# Patient Record
Sex: MALE | Race: OTHER | NOT HISPANIC OR LATINO
[De-identification: names, ages, dates, MRNs, and addresses within clinical notes are randomized per-mention and may not be internally consistent; named-entity substitution may affect disease eponyms.]

---

## 2018-12-04 ENCOUNTER — APPOINTMENT (OUTPATIENT)
Dept: UROLOGY | Facility: CLINIC | Age: 28
End: 2018-12-04

## 2019-11-22 ENCOUNTER — APPOINTMENT (OUTPATIENT)
Dept: BARIATRICS | Facility: CLINIC | Age: 29
End: 2019-11-22
Payer: COMMERCIAL

## 2019-11-22 ENCOUNTER — LABORATORY RESULT (OUTPATIENT)
Age: 29
End: 2019-11-22

## 2019-11-22 VITALS
SYSTOLIC BLOOD PRESSURE: 137 MMHG | HEIGHT: 74.5 IN | WEIGHT: 315 LBS | TEMPERATURE: 97.9 F | BODY MASS INDEX: 40 KG/M2 | DIASTOLIC BLOOD PRESSURE: 88 MMHG | OXYGEN SATURATION: 98 % | HEART RATE: 56 BPM

## 2019-11-22 PROCEDURE — 99212 OFFICE O/P EST SF 10 MIN: CPT

## 2019-11-25 LAB
ALBUMIN SERPL ELPH-MCNC: 4.5 G/DL
ALP BLD-CCNC: 89 U/L
ALT SERPL-CCNC: 22 U/L
ANION GAP SERPL CALC-SCNC: 13 MMOL/L
AST SERPL-CCNC: 19 U/L
BASOPHILS # BLD AUTO: 0.04 K/UL
BASOPHILS NFR BLD AUTO: 0.6 %
BILIRUB SERPL-MCNC: 0.4 MG/DL
BUN SERPL-MCNC: 12 MG/DL
CALCIUM SERPL-MCNC: 9.3 MG/DL
CHLORIDE SERPL-SCNC: 105 MMOL/L
CO2 SERPL-SCNC: 24 MMOL/L
CREAT SERPL-MCNC: 1.04 MG/DL
EOSINOPHIL # BLD AUTO: 0.23 K/UL
EOSINOPHIL NFR BLD AUTO: 3.4 %
GLUCOSE SERPL-MCNC: 90 MG/DL
HCT VFR BLD CALC: 48.4 %
HGB BLD-MCNC: 15.5 G/DL
IMM GRANULOCYTES NFR BLD AUTO: 0.6 %
LYMPHOCYTES # BLD AUTO: 2.54 K/UL
LYMPHOCYTES NFR BLD AUTO: 38.1 %
MAGNESIUM SERPL-MCNC: 2.2 MG/DL
MAN DIFF?: NORMAL
MCHC RBC-ENTMCNC: 27.1 PG
MCHC RBC-ENTMCNC: 32 GM/DL
MCV RBC AUTO: 84.8 FL
MONOCYTES # BLD AUTO: 0.71 K/UL
MONOCYTES NFR BLD AUTO: 10.6 %
NEUTROPHILS # BLD AUTO: 3.11 K/UL
NEUTROPHILS NFR BLD AUTO: 46.7 %
PHOSPHATE SERPL-MCNC: 3.9 MG/DL
PLATELET # BLD AUTO: 238 K/UL
POTASSIUM SERPL-SCNC: 5 MMOL/L
PROT SERPL-MCNC: 7.1 G/DL
RBC # BLD: 5.71 M/UL
RBC # FLD: 13.2 %
SODIUM SERPL-SCNC: 142 MMOL/L
WBC # FLD AUTO: 6.67 K/UL

## 2019-11-27 ENCOUNTER — APPOINTMENT (OUTPATIENT)
Dept: BARIATRICS | Facility: CLINIC | Age: 29
End: 2019-11-27
Payer: COMMERCIAL

## 2019-11-27 VITALS
HEIGHT: 74.5 IN | WEIGHT: 315 LBS | HEART RATE: 65 BPM | BODY MASS INDEX: 40 KG/M2 | DIASTOLIC BLOOD PRESSURE: 79 MMHG | OXYGEN SATURATION: 98 % | TEMPERATURE: 97.7 F | SYSTOLIC BLOOD PRESSURE: 115 MMHG

## 2019-11-27 PROCEDURE — 99213 OFFICE O/P EST LOW 20 MIN: CPT

## 2019-11-27 NOTE — HISTORY OF PRESENT ILLNESS
[de-identified] : s/p .lap band and seen on friday with progressive emesis and ugi that shows hold up at band\par today removed 3 cc of fluid \par will have drink h2o and if does not go down will have to go to ed\par also have eat yogurt before leaving [facility]

## 2020-04-09 ENCOUNTER — TRANSCRIPTION ENCOUNTER (OUTPATIENT)
Age: 30
End: 2020-04-09

## 2020-11-12 ENCOUNTER — EMERGENCY (EMERGENCY)
Facility: HOSPITAL | Age: 30
LOS: 0 days | Discharge: HOME | End: 2020-11-12
Attending: STUDENT IN AN ORGANIZED HEALTH CARE EDUCATION/TRAINING PROGRAM | Admitting: STUDENT IN AN ORGANIZED HEALTH CARE EDUCATION/TRAINING PROGRAM
Payer: COMMERCIAL

## 2020-11-12 ENCOUNTER — TRANSCRIPTION ENCOUNTER (OUTPATIENT)
Age: 30
End: 2020-11-12

## 2020-11-12 VITALS
SYSTOLIC BLOOD PRESSURE: 155 MMHG | HEART RATE: 57 BPM | TEMPERATURE: 99 F | WEIGHT: 315 LBS | DIASTOLIC BLOOD PRESSURE: 75 MMHG | RESPIRATION RATE: 18 BRPM | HEIGHT: 76 IN | OXYGEN SATURATION: 99 %
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
## 2020-11-12 DIAGNOSIS — M79.672 PAIN IN LEFT FOOT: ICD-10-CM

## 2020-11-12 PROCEDURE — 99283 EMERGENCY DEPT VISIT LOW MDM: CPT

## 2020-11-12 PROCEDURE — 73630 X-RAY EXAM OF FOOT: CPT | Mod: 26,LT

## 2020-11-12 RX ORDER — IBUPROFEN 200 MG
600 TABLET ORAL ONCE
Refills: 0 | Status: COMPLETED | OUTPATIENT
Start: 2020-11-12 | End: 2020-11-12

## 2020-11-12 RX ADMIN — Medication 600 MILLIGRAM(S): at 12:18

## 2020-11-12 NOTE — ED PROVIDER NOTE - CARE PROVIDER_API CALL
Kennedy Shepard  [facility]  [address] [place], [place] [id number]  [place] [address]  Phone: [phone]  Fax: [phone]  Follow Up Time: 1-3 Days

## 2020-11-12 NOTE — ED PROVIDER NOTE - NS ED ROS FT
Constitutional: (-) fever  Eyes/ENT: (-) blurry vision, (-) epistaxis  Cardiovascular: (-) chest pain, (-) syncope  Respiratory: (-) cough, (-) shortness of breath  Gastrointestinal: (-) vomiting, (-) diarrhea  Musculoskeletal: (-) neck pain, (-) back pain, (+) foot pain   Integumentary: (-) rash, (-) edema  Neurological: (-) headache, (-) altered mental status  Psychiatric: (-) hallucinations  Allergic/Immunologic: (-) pruritus

## 2020-11-12 NOTE — ED PROVIDER NOTE - CARE PROVIDERS DIRECT ADDRESSES
[email or web address]

## 2020-11-12 NOTE — ED ADULT TRIAGE NOTE - STATUS:
Chest x-ray:  Two views.

 

History:  Chest pain.  No comparison views.

 

Findings:  The patient is status post prior median sternotomy.  EKG monitoring

electrodes overlie the chest.  The heart is mildly enlarged.  Pulmonary

vasculature is not increased.  Pleural angles are sharp.  No significant bony

abnormality is seen.

 

Impression:

 

Mild cardiomegaly.  Otherwise no acute disease.

 

 

Signed by

Ric Dowell MD 01/07/2017 08:47 A [employer]

## 2020-11-12 NOTE — ED PROVIDER NOTE - OBJECTIVE STATEMENT
Pt is a 31 yo M no sig pmh presenting w/ L foot pain. Per pt this am @ 6am he stepped on a lego at home. Since then has been having significant  pain especially on ambulation. Pt went to an [facility] but was unable to get an xray due to lack of techs and was sent to the ED for eval.

## 2020-11-12 NOTE — ED PROVIDER NOTE - PHYSICAL EXAMINATION
CONSTITUTIONAL: Well-developed; well-nourished; in no acute distress.   SKIN: warm, dry  HEAD: Normocephalic; atraumatic.  EYES: PERRL, EOMI, normal sclera and conjunctiva   ENT: No nasal discharge; airway clear.  NECK: Supple; non tender.  CARD:  Regular rate and rhythm.   RESP: NO inc WOB   ABD: soft ntnd  EXT: Normal ROM.  +mild tenderness to palpation over the distal plantar midfoot around 5th toe, no laceration/absrasion, no ecchymosis  LYMPH: No acute cervical adenopathy.  NEURO: Alert, oriented, grossly unremarkable  PSYCH: Cooperative, appropriate.

## 2020-11-12 NOTE — ED PROVIDER NOTE - CLINICAL SUMMARY MEDICAL DECISION MAKING FREE TEXT BOX
pt w/ foot pain after stepping on leggo. pain around plantar 5th toe radiating around plantar aspect of foot. xr w/o fx, pt ambulatory. will rec RICE, nsaids, WBAT

## 2020-11-12 NOTE — ED PROVIDER NOTE - PROGRESS NOTE DETAILS
OG : discussed results with pt. He was offered boot but declined . We offered him and ace wrap and ice pack and he declined

## 2020-11-12 NOTE — ED PROVIDER NOTE - ATTENDING CONTRIBUTION TO CARE
31 yo m presents w/ L foot pain after stepping on leggo yesterday. mild redness/tenderness to plantar aspect of distal midfoot.  pt ambulatory w/ pain.     vss  gen- NAD, aaox3  card-rrr  lungs-ctab, no wheezing or rhonchi  abd-sntnd, no guarding or rebound  neuro- full str/sensation, cn ii-xii grossly intact, normal coordination and gait  LLE- mild tenderness to distal plantar midfoot, no laceration/absrasion, no ecchymosis      XR, NSAID 29 yo m presents w/ L foot pain after stepping on leggo yesterday. mild redness/tenderness to plantar aspect of distal midfoot.  pt ambulatory w/ pain.     vss  gen- NAD, aaox3  card-rrr  lungs-ctab, no wheezing or rhonchi  abd-sntnd, no guarding or rebound  neuro- full str/sensation, cn ii-xii grossly intact, normal coordination and gait  LLE- mild tenderness to distal plantar midfoot around 5th toe, no laceration/absrasion, no ecchymosis      XR r/o fx, NSAID/RICE

## 2020-11-12 NOTE — ED ADULT NURSE NOTE - OBJECTIVE STATEMENT
Pt presented to ED c/o foot pain. Pt sent from outpatient [facility] for L foot pain. Pt able to ambulate as tolerated, denies n/v/d/fevers/chills. + pulses noted.

## 2020-11-12 NOTE — ED PROVIDER NOTE - PATIENT PORTAL LINK FT
You can access the FollowMyHealth Patient Portal offered by [facility] by registering at the following website: [email or web address]. By joining [employer]’s FollowMyHealth portal, you will also be able to view your health information using other applications (apps) compatible with our system.

## 2020-11-25 ENCOUNTER — APPOINTMENT (OUTPATIENT)
Dept: BARIATRICS | Facility: CLINIC | Age: 30
End: 2020-11-25

## 2021-04-27 ENCOUNTER — TRANSCRIPTION ENCOUNTER (OUTPATIENT)
Age: 31
End: 2021-04-27

## 2021-08-13 ENCOUNTER — APPOINTMENT (OUTPATIENT)
Dept: SURGERY | Facility: CLINIC | Age: 31
End: 2021-08-13
Payer: COMMERCIAL

## 2021-08-13 VITALS
TEMPERATURE: 95.8 F | HEIGHT: 75 IN | OXYGEN SATURATION: 98 % | HEART RATE: 81 BPM | DIASTOLIC BLOOD PRESSURE: 70 MMHG | BODY MASS INDEX: 39.17 KG/M2 | WEIGHT: 315 LBS | SYSTOLIC BLOOD PRESSURE: 120 MMHG

## 2021-08-13 PROCEDURE — 99244 OFF/OP CNSLTJ NEW/EST MOD 40: CPT

## 2021-08-17 NOTE — ASSESSMENT
[FreeTextEntry1] : 31 yo M s/p lap band placement at age 17 and subsequent removal in Nov 2021 due to a slipped band, with class 3 obesity, here today to discuss further surgical weight loss options

## 2021-08-17 NOTE — PLAN
[FreeTextEntry1] : Mr. MARTIN FLANNERY is a 30 year year old male with obesity who is interested in undergoing laparoscopic sleeve gastrectomy for weight loss.  We discussed in detail how Bariatric Surgery is a tool to help treat the serious disease of obesity, and that His compliance with diet, exercise, and follow-up is crucial to His overall safety and success.  He will require the following pre-operative evaluations and testing:\par \par Lab work\par Psychological evaluation\par Diet history\par Cardiology evaluation\par Pulmonology evaluation, including sleep study\par Gastroenterology evaluation for upper endoscopy\par PMD clearance\par Nutritional evaluation\par Attendance at preoperative support groups\par \par We had a discussion about potential post-operative complications, including but not limited to: bleeding, infection, leak, stricture, blood clots, GERD, problems with anesthesia.  The patient understands and wishes to proceed.\par \par We discussed that smoking of any kind will preclude the patient from being able to have bariatric surgery.\par \par

## 2021-08-17 NOTE — HISTORY OF PRESENT ILLNESS
[de-identified] : Mr. MARTIN AESNCIO is a pleasant 30 year year old male who has been struggling with His weight for many years.    Mr. MARTIN ASENCIO has tried countless diet and exercise programs, but has been unable to lose sufficient weight and keep it off.  He is here today to discuss surgical options for weight loss.\par \par Mr Asencio had a lap band placed by Dr Ho at age 17 and was able to lose a significant amount of weight after that procedure. He was at one point able to achieve a weight of 285 lbs. 6 years ago he began having issues with the band and eventually was diagnosed with a slipped band and the band was removed. Since the band was removed has thinks he has gained around 200 lbs. He felt like he was able to lose weight with the band when it was functional. His mother has a sleeve and was very successful with her weight loss.\par \par He is very active and is a . He works out (running and lifting weight) often. 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 \par \par No other medical problems. GERD disappeared after band removed.

## 2021-09-14 ENCOUNTER — APPOINTMENT (OUTPATIENT)
Dept: SURGERY | Facility: CLINIC | Age: 31
End: 2021-09-14

## 2021-10-06 ENCOUNTER — NON-APPOINTMENT (OUTPATIENT)
Age: 31
End: 2021-10-06

## 2021-11-04 ENCOUNTER — TRANSCRIPTION ENCOUNTER (OUTPATIENT)
Age: 31
End: 2021-11-04

## 2021-11-04 ENCOUNTER — APPOINTMENT (OUTPATIENT)
Dept: SURGERY | Facility: CLINIC | Age: 31
End: 2021-11-04
Payer: COMMERCIAL

## 2021-11-04 VITALS — HEIGHT: 75 IN

## 2021-11-04 DIAGNOSIS — R11.15 CYCLICAL VOMITING SYNDROME UNRELATED TO MIGRAINE: ICD-10-CM

## 2021-11-04 PROCEDURE — ZZZZZ: CPT

## 2021-11-08 ENCOUNTER — APPOINTMENT (OUTPATIENT)
Dept: NEUROPSYCHOLOGY | Facility: CLINIC | Age: 31
End: 2021-11-08

## 2021-11-08 PROBLEM — R11.15 EMESIS, PERSISTENT: Status: RESOLVED | Noted: 2019-11-27 | Resolved: 2021-11-08

## 2021-11-09 ENCOUNTER — APPOINTMENT (OUTPATIENT)
Dept: NEUROPSYCHOLOGY | Facility: CLINIC | Age: 31
End: 2021-11-09

## 2021-11-15 ENCOUNTER — OUTPATIENT (OUTPATIENT)
Dept: OUTPATIENT SERVICES | Facility: HOSPITAL | Age: 31
LOS: 1 days | Discharge: HOME | End: 2021-11-15

## 2021-11-15 ENCOUNTER — APPOINTMENT (OUTPATIENT)
Dept: NEUROPSYCHOLOGY | Facility: CLINIC | Age: 31
End: 2021-11-15

## 2021-11-15 DIAGNOSIS — F50.9 EATING DISORDER, UNSPECIFIED: ICD-10-CM

## 2021-11-17 ENCOUNTER — APPOINTMENT (OUTPATIENT)
Age: 31
End: 2021-11-17

## 2021-12-08 ENCOUNTER — APPOINTMENT (OUTPATIENT)
Dept: SURGERY | Facility: CLINIC | Age: 31
End: 2021-12-08

## 2021-12-29 ENCOUNTER — APPOINTMENT (OUTPATIENT)
Dept: SURGERY | Facility: CLINIC | Age: 31
End: 2021-12-29

## 2022-03-03 ENCOUNTER — TRANSCRIPTION ENCOUNTER (OUTPATIENT)
Age: 32
End: 2022-03-03

## 2022-05-15 ENCOUNTER — NON-APPOINTMENT (OUTPATIENT)
Age: 32
End: 2022-05-15

## 2022-06-10 ENCOUNTER — NON-APPOINTMENT (OUTPATIENT)
Age: 32
End: 2022-06-10

## 2022-07-08 ENCOUNTER — NON-APPOINTMENT (OUTPATIENT)
Age: 32
End: 2022-07-08

## 2022-07-28 ENCOUNTER — NON-APPOINTMENT (OUTPATIENT)
Age: 32
End: 2022-07-28

## 2023-07-16 ENCOUNTER — EMERGENCY (EMERGENCY)
Facility: HOSPITAL | Age: 33
LOS: 1 days | Discharge: HOME | End: 2023-07-16
Attending: EMERGENCY MEDICINE | Admitting: EMERGENCY MEDICINE
Payer: SELF-PAY

## 2023-07-16 VITALS
WEIGHT: 315 LBS | HEIGHT: 76 IN | RESPIRATION RATE: 18 BRPM | DIASTOLIC BLOOD PRESSURE: 86 MMHG | OXYGEN SATURATION: 98 % | HEART RATE: 64 BPM | TEMPERATURE: 98 F | SYSTOLIC BLOOD PRESSURE: 130 MMHG

## 2023-07-16 VITALS
DIASTOLIC BLOOD PRESSURE: 93 MMHG | OXYGEN SATURATION: 97 % | HEART RATE: 67 BPM | TEMPERATURE: 98 F | SYSTOLIC BLOOD PRESSURE: 143 MMHG | RESPIRATION RATE: 17 BRPM

## 2023-07-16 LAB
ALBUMIN SERPL ELPH-MCNC: 3.4 G/DL — SIGNIFICANT CHANGE UP (ref 3.4–5)
ALP SERPL-CCNC: 87 U/L — SIGNIFICANT CHANGE UP (ref 40–120)
ALT FLD-CCNC: 24 U/L — SIGNIFICANT CHANGE UP (ref 12–42)
ANION GAP SERPL CALC-SCNC: 7 MMOL/L — LOW (ref 9–16)
AST SERPL-CCNC: 21 U/L — SIGNIFICANT CHANGE UP (ref 15–37)
BASOPHILS # BLD AUTO: 0.06 K/UL — SIGNIFICANT CHANGE UP (ref 0–0.2)
BASOPHILS NFR BLD AUTO: 0.5 % — SIGNIFICANT CHANGE UP (ref 0–2)
BILIRUB SERPL-MCNC: 0.2 MG/DL — SIGNIFICANT CHANGE UP (ref 0.2–1.2)
BUN SERPL-MCNC: 20 MG/DL — SIGNIFICANT CHANGE UP (ref 7–23)
CALCIUM SERPL-MCNC: 9 MG/DL — SIGNIFICANT CHANGE UP (ref 8.5–10.5)
CHLORIDE SERPL-SCNC: 107 MMOL/L — SIGNIFICANT CHANGE UP (ref 96–108)
CO2 SERPL-SCNC: 27 MMOL/L — SIGNIFICANT CHANGE UP (ref 22–31)
CREAT SERPL-MCNC: 1.29 MG/DL — SIGNIFICANT CHANGE UP (ref 0.5–1.3)
EGFR: 75 ML/MIN/1.73M2 — SIGNIFICANT CHANGE UP
EOSINOPHIL # BLD AUTO: 0.22 K/UL — SIGNIFICANT CHANGE UP (ref 0–0.5)
EOSINOPHIL NFR BLD AUTO: 1.7 % — SIGNIFICANT CHANGE UP (ref 0–6)
ETHANOL SERPL-MCNC: <3 MG/DL — SIGNIFICANT CHANGE UP
FLUAV AG NPH QL: SIGNIFICANT CHANGE UP
FLUBV AG NPH QL: SIGNIFICANT CHANGE UP
GLUCOSE SERPL-MCNC: 109 MG/DL — HIGH (ref 70–99)
HCT VFR BLD CALC: 44.8 % — SIGNIFICANT CHANGE UP (ref 39–50)
HGB BLD-MCNC: 14.9 G/DL — SIGNIFICANT CHANGE UP (ref 13–17)
IMM GRANULOCYTES NFR BLD AUTO: 0.5 % — SIGNIFICANT CHANGE UP (ref 0–0.9)
LIDOCAIN IGE QN: 69 U/L — LOW (ref 73–393)
LYMPHOCYTES # BLD AUTO: 2.6 K/UL — SIGNIFICANT CHANGE UP (ref 1–3.3)
LYMPHOCYTES # BLD AUTO: 20 % — SIGNIFICANT CHANGE UP (ref 13–44)
MAGNESIUM SERPL-MCNC: 2.3 MG/DL — SIGNIFICANT CHANGE UP (ref 1.6–2.6)
MCHC RBC-ENTMCNC: 27.9 PG — SIGNIFICANT CHANGE UP (ref 27–34)
MCHC RBC-ENTMCNC: 33.3 GM/DL — SIGNIFICANT CHANGE UP (ref 32–36)
MCV RBC AUTO: 83.9 FL — SIGNIFICANT CHANGE UP (ref 80–100)
MONOCYTES # BLD AUTO: 1.06 K/UL — HIGH (ref 0–0.9)
MONOCYTES NFR BLD AUTO: 8.2 % — SIGNIFICANT CHANGE UP (ref 2–14)
NEUTROPHILS # BLD AUTO: 8.98 K/UL — HIGH (ref 1.8–7.4)
NEUTROPHILS NFR BLD AUTO: 69.1 % — SIGNIFICANT CHANGE UP (ref 43–77)
NRBC # BLD: 0 /100 WBCS — SIGNIFICANT CHANGE UP (ref 0–0)
PLATELET # BLD AUTO: 282 K/UL — SIGNIFICANT CHANGE UP (ref 150–400)
POTASSIUM SERPL-MCNC: 4.1 MMOL/L — SIGNIFICANT CHANGE UP (ref 3.5–5.3)
POTASSIUM SERPL-SCNC: 4.1 MMOL/L — SIGNIFICANT CHANGE UP (ref 3.5–5.3)
PROT SERPL-MCNC: 7.3 G/DL — SIGNIFICANT CHANGE UP (ref 6.4–8.2)
RBC # BLD: 5.34 M/UL — SIGNIFICANT CHANGE UP (ref 4.2–5.8)
RBC # FLD: 13.2 % — SIGNIFICANT CHANGE UP (ref 10.3–14.5)
RSV RNA NPH QL NAA+NON-PROBE: SIGNIFICANT CHANGE UP
SARS-COV-2 RNA SPEC QL NAA+PROBE: SIGNIFICANT CHANGE UP
SODIUM SERPL-SCNC: 141 MMOL/L — SIGNIFICANT CHANGE UP (ref 132–145)
TROPONIN I, HIGH SENSITIVITY RESULT: 7.2 NG/L — SIGNIFICANT CHANGE UP
WBC # BLD: 12.99 K/UL — HIGH (ref 3.8–10.5)
WBC # FLD AUTO: 12.99 K/UL — HIGH (ref 3.8–10.5)

## 2023-07-16 PROCEDURE — 99285 EMERGENCY DEPT VISIT HI MDM: CPT | Mod: 25

## 2023-07-16 PROCEDURE — 99053 MED SERV 10PM-8AM 24 HR FAC: CPT

## 2023-07-16 PROCEDURE — 71045 X-RAY EXAM CHEST 1 VIEW: CPT | Mod: 26

## 2023-07-16 PROCEDURE — 29515 APPLICATION SHORT LEG SPLINT: CPT

## 2023-07-16 PROCEDURE — 73590 X-RAY EXAM OF LOWER LEG: CPT | Mod: 26,RT

## 2023-07-16 PROCEDURE — 73610 X-RAY EXAM OF ANKLE: CPT | Mod: 26,LT,76

## 2023-07-16 RX ORDER — OXYCODONE AND ACETAMINOPHEN 5; 325 MG/1; MG/1
1 TABLET ORAL
Qty: 8 | Refills: 0
Start: 2023-07-16 | End: 2023-07-17

## 2023-07-16 RX ORDER — MORPHINE SULFATE 50 MG/1
4 CAPSULE, EXTENDED RELEASE ORAL ONCE
Refills: 0 | Status: DISCONTINUED | OUTPATIENT
Start: 2023-07-16 | End: 2023-07-16

## 2023-07-16 RX ORDER — OXYCODONE AND ACETAMINOPHEN 5; 325 MG/1; MG/1
2 TABLET ORAL ONCE
Refills: 0 | Status: DISCONTINUED | OUTPATIENT
Start: 2023-07-16 | End: 2023-07-16

## 2023-07-16 RX ORDER — IBUPROFEN 200 MG
1 TABLET ORAL
Qty: 30 | Refills: 0
Start: 2023-07-16 | End: 2023-07-25

## 2023-07-16 RX ORDER — IBUPROFEN 200 MG
1 TABLET ORAL
Qty: 30 | Refills: 0
Start: 2023-07-16

## 2023-07-16 RX ORDER — MORPHINE SULFATE 50 MG/1
8 CAPSULE, EXTENDED RELEASE ORAL ONCE
Refills: 0 | Status: DISCONTINUED | OUTPATIENT
Start: 2023-07-16 | End: 2023-07-16

## 2023-07-16 RX ORDER — ONDANSETRON 8 MG/1
4 TABLET, FILM COATED ORAL ONCE
Refills: 0 | Status: COMPLETED | OUTPATIENT
Start: 2023-07-16 | End: 2023-07-16

## 2023-07-16 RX ORDER — SODIUM CHLORIDE 9 MG/ML
1000 INJECTION INTRAMUSCULAR; INTRAVENOUS; SUBCUTANEOUS ONCE
Refills: 0 | Status: COMPLETED | OUTPATIENT
Start: 2023-07-16 | End: 2023-07-16

## 2023-07-16 RX ORDER — OXYCODONE AND ACETAMINOPHEN 5; 325 MG/1; MG/1
1 TABLET ORAL
Qty: 12 | Refills: 0
Start: 2023-07-16 | End: 2023-07-18

## 2023-07-16 RX ADMIN — ONDANSETRON 4 MILLIGRAM(S): 8 TABLET, FILM COATED ORAL at 02:57

## 2023-07-16 RX ADMIN — MORPHINE SULFATE 4 MILLIGRAM(S): 50 CAPSULE, EXTENDED RELEASE ORAL at 01:53

## 2023-07-16 RX ADMIN — MORPHINE SULFATE 8 MILLIGRAM(S): 50 CAPSULE, EXTENDED RELEASE ORAL at 02:02

## 2023-07-16 RX ADMIN — OXYCODONE AND ACETAMINOPHEN 2 TABLET(S): 5; 325 TABLET ORAL at 07:55

## 2023-07-16 RX ADMIN — SODIUM CHLORIDE 1000 MILLILITER(S): 9 INJECTION INTRAMUSCULAR; INTRAVENOUS; SUBCUTANEOUS at 02:57

## 2023-07-16 RX ADMIN — MORPHINE SULFATE 8 MILLIGRAM(S): 50 CAPSULE, EXTENDED RELEASE ORAL at 05:36

## 2023-07-16 NOTE — ED PROVIDER NOTE - PHYSICAL EXAMINATION
Constitutional: awake and alert, in no acute distress  HEENT: head normocephalic and atraumatic. moist mucous membranes  Eyes: extraocular movements intact, normal conjunctiva  Neck: supple, normal ROM  Cardiovascular: regular rate   Pulmonary: no respiratory distress  Gastrointestinal: abdomen flat and nondistended  Skin: warm, dry, normal for ethnicity  Musculoskeletal: R ankle swelling and gross deformity, +TTP throughout joint.  Neurological: oriented x4, no focal neurologic deficit.   Psychiatric: calm and cooperative

## 2023-07-16 NOTE — ED ADULT NURSE NOTE - CHIEF COMPLAINT QUOTE
BIBA for syncope. Pt. was walking on [employer] when he had a coughing fit and then fainted for about 30 sec. Pt. friend denies head trauma. Pt. c/o pain to R. foot, nausea, and headache.

## 2023-07-16 NOTE — ED PROVIDER NOTE - OBJECTIVE STATEMENT
31yo M hx of obesity presents with R ankle pain after syncopal episode.  Pt states he was walking in the >90deg heat tonight, felt lightheaded, then fell to his knees.  Twisted his ankle as he fell.  Did not hit his head or lose consciousness.  Has not been able to ambulate after fall.  No CP, SOB, or palpitations.

## 2023-07-16 NOTE — ED PROVIDER NOTE - PATIENT PORTAL LINK FT
You can access the FollowMyHealth Patient Portal offered by [facility] by registering at the following website: [email or web address]. By joining [employer]’s FollowMyHealth portal, you will also be able to view your health information using other applications (apps) compatible with our system.

## 2023-07-16 NOTE — ED PROVIDER NOTE - NSFOLLOWUPINSTRUCTIONS_ED_ALL_ED_FT
Ankle Fracture    An ankle fracture is a break in one, two, or all three of the bones in your ankle joint. The ankle joint is made up of:  The lower parts of your tibia and fibula. These are your lower leg bones.  A bone in the foot called the talus.  What are the causes?  A hard, direct hit to the ankle.  Twisting your ankle fast and very badly.  Getting injured in a car crash or from a fall from high up.  What increases the risk?  Being overweight.  Doing certain sports, such as soccer, gymnastics, or football.  What are the signs or symptoms?    A tender and swollen ankle.  Bruising around your ankle.  Pain when you move or press on your ankle.  Trouble walking.  Trouble using your ankle to support your body weight (putting weight on your ankle).  Pain that gets worse:  When you move your ankle or foot.  When you stand.  Pain that gets better with rest.  How is this treated?  This condition may be treated with:  A cast, boot, or splint.  Crutches.  Surgery.  Staying off your injured foot.  Exercises to make your ankle move better and get stronger.  Follow these instructions at home:  Your doctor may tell you to do these things:    If you have a boot or splint:    Wear the boot or splint as told by your doctor. Take it off only as told by your doctor.  Loosen it if your toes:  Tingle.  Lose feeling (get numb).  Turn cold and blue.  Keep it clean and dry.  If you have a cast:    Do not put pressure on any part of the cast until it is fully hardened.  Do not stick anything inside the cast to scratch your skin.  Check the skin around the cast every day. Tell your doctor if you see problems.  You may put lotion on dry skin around the cast. Do not put lotion on the skin under the cast.  Keep it clean and dry.  Bathing    Do not take baths, swim, or use a hot tub. Ask your doctor about taking showers or sponge baths.  If the cast, splint, or boot is not waterproof:  Do not let it get wet.  Cover it with a watertight covering when you take a bath or shower.  Managing pain, stiffness, and swelling      If told, put ice on the injured area. To do this:  If you have a removable splint or boot, take it off as told by your doctor.  Put ice in a plastic bag.  Place a towel between your skin and the bag or between your cast and the bag.  Leave the ice on for 20 minutes, 2–3 times a day.  Take off the ice if your skin turns bright red. This is very important. If you cannot feel pain, heat, or cold, you have a greater risk of damage to the area.  Move your toes often.  Raise the injured area above the level of your heart while you are sitting or lying down.  Activity    Do exercises as told by your doctor.  Return to your normal activities when your doctor says that it is safe.  Use crutches to support your body weight. Do not use your injured leg to support your body weight until your doctor says that you can.  General instructions    Take over-the-counter and prescription medicines only as told by your doctor.  Ask your doctor when it is safe to drive if you have a cast, boot, or splint on your leg.  Do not smoke or use any products that contain nicotine or tobacco. These can delay bone healing. If you need help quitting, ask your doctor.  Keep all follow-up visits.  Contact a doctor if:  Your pain or swelling gets worse.  Your pain or swelling does not get better with rest or medicine.  Your cast gets damaged.  Get help right away if:  You have very bad pain that lasts.  You get new pain or swelling.  Your skin or toes below the injured ankle:  Turn blue or gray.  Feel cold.  Lose feeling.  Have less feeling than normal when something touches them.  Summary  An ankle fracture is a break in one, two, or three bones in your lower leg and foot.  An ankle fracture may be treated with a cast, a boot, a splint, or surgery.  Do not use your injured leg to support your body weight until your doctor says that you can.  Use ice, take medicines, and raise your foot as told. Do not smoke or use products that contain nicotine or tobacco.  This information is not intended to replace advice given to you by your health care provider. Make sure you discuss any questions you have with your health care provider.

## 2023-07-16 NOTE — ED PROVIDER NOTE - PROGRESS NOTE DETAILS
XR w R ankle fracture; discussed w Dr. Cuello from ortho.  Plan for splint in ED, follow up with Dr. Cuello.  Pt splinted and given crutches.  Stable for dc.

## 2023-07-16 NOTE — ED PROVIDER NOTE - CLINICAL SUMMARY MEDICAL DECISION MAKING FREE TEXT BOX
Pt w hx of obesity presents with R ankle pain after near syncopal episode, twisting ankle as he fell.  On exam afebrile, VSS, well appearing, with R ankle swelling and gross deformity, +TTP throughout ankle joint, R foot NVI.  EKG is NSR, nonischemic.  No prolonged QT, no delta wave, no brugada pattern, no patterns concerning for HOCM.  Suspect vasovagal near syncope vs. dehydration.  Plan for labs, IV hydration, XR ankle, analgesia, reassess.

## 2023-07-16 NOTE — ED ADULT TRIAGE NOTE - CHIEF COMPLAINT QUOTE
BIBA for syncope. Pt. was walking on [employer] when he had a coughing fit and then fainted for about 30 sec. Pt. friend denies head trauma. Pt. c/o pain to R. foot, nausea, and headache.

## 2023-07-16 NOTE — ED ADULT NURSE NOTE - NSFALLRISKINTERV_ED_ALL_ED
Assistance OOB with selected safe patient handling equipment if applicable/Assistance with ambulation/Communicate fall risk and risk factors to all staff, patient, and family/Monitor gait and stability/Provide visual cue: yellow wristband, yellow gown, etc/Reinforce activity limits and safety measures with patient and family/Bed in lowest position, wheels locked, appropriate side rails in place/Call bell, personal items and telephone in reach/Instruct patient to call for assistance before getting out of bed/chair/stretcher/Non-slip footwear applied when patient is off stretcher/[place] to call system/Physically safe environment - no spills, clutter or unnecessary equipment/Purposeful Proactive Rounding/Room/bathroom lighting operational, light cord in reach

## 2023-07-19 DIAGNOSIS — S82.831A OTHER FRACTURE OF UPPER AND LOWER END OF RIGHT FIBULA, INITIAL ENCOUNTER FOR CLOSED FRACTURE: ICD-10-CM
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
## 2023-07-19 DIAGNOSIS — R42 DIZZINESS AND GIDDINESS: ICD-10-CM

## 2023-07-19 DIAGNOSIS — M25.571 PAIN IN RIGHT ANKLE AND JOINTS OF RIGHT FOOT: ICD-10-CM

## 2023-07-19 DIAGNOSIS — R55 SYNCOPE AND COLLAPSE: ICD-10-CM

## 2023-07-19 DIAGNOSIS — Z86.39 PERSONAL HISTORY OF OTHER ENDOCRINE, NUTRITIONAL AND METABOLIC DISEASE: ICD-10-CM
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
## 2023-07-31 ENCOUNTER — APPOINTMENT (OUTPATIENT)
Dept: RADIOLOGY | Facility: CLINIC | Age: 33
End: 2023-07-31

## 2023-07-31 ENCOUNTER — OUTPATIENT (OUTPATIENT)
Dept: OUTPATIENT SERVICES | Facility: HOSPITAL | Age: 33
LOS: 1 days | End: 2023-07-31
Payer: SELF-PAY

## 2023-07-31 PROCEDURE — 73610 X-RAY EXAM OF ANKLE: CPT | Mod: 26,RT

## 2023-07-31 PROCEDURE — 73630 X-RAY EXAM OF FOOT: CPT | Mod: 26,RT

## 2023-08-14 ENCOUNTER — APPOINTMENT (OUTPATIENT)
Dept: RADIOLOGY | Facility: CLINIC | Age: 33
End: 2023-08-14
Payer: SELF-PAY

## 2023-08-14 ENCOUNTER — OUTPATIENT (OUTPATIENT)
Dept: OUTPATIENT SERVICES | Facility: HOSPITAL | Age: 33
LOS: 1 days | End: 2023-08-14

## 2023-08-14 PROCEDURE — 73610 X-RAY EXAM OF ANKLE: CPT | Mod: 26,RT

## 2023-12-20 ENCOUNTER — APPOINTMENT (OUTPATIENT)
Dept: BARIATRICS | Facility: CLINIC | Age: 33
End: 2023-12-20
Payer: COMMERCIAL

## 2023-12-20 VITALS
HEIGHT: 75 IN | TEMPERATURE: 97.9 F | BODY MASS INDEX: 39.17 KG/M2 | WEIGHT: 315 LBS | DIASTOLIC BLOOD PRESSURE: 81 MMHG | SYSTOLIC BLOOD PRESSURE: 140 MMHG | OXYGEN SATURATION: 98 % | HEART RATE: 71 BPM

## 2023-12-20 DIAGNOSIS — Z78.9 OTHER SPECIFIED HEALTH STATUS: ICD-10-CM

## 2023-12-20 DIAGNOSIS — E66.01 MORBID (SEVERE) OBESITY DUE TO EXCESS CALORIES: ICD-10-CM

## 2023-12-20 DIAGNOSIS — Z82.49 FAMILY HISTORY OF ISCHEMIC HEART DISEASE AND OTHER DISEASES OF THE CIRCULATORY SYSTEM: ICD-10-CM

## 2023-12-20 DIAGNOSIS — F50.9 EATING DISORDER, UNSPECIFIED: ICD-10-CM

## 2023-12-20 PROCEDURE — 99204 OFFICE O/P NEW MOD 45 MIN: CPT

## 2023-12-20 PROCEDURE — 99214 OFFICE O/P EST MOD 30 MIN: CPT

## 2023-12-20 NOTE — ASSESSMENT
[FreeTextEntry1] : Mr. Matt Asencio is seen today. Previously, seen in 2007, at which time, he had a lap band at age 17, returned in 2019 with emesis, fluid was removed after which he did well then visited ER in [place] which I assume was slippage of the lap band which was treated with removal. Today, his weight increased to 464 lbs. which gives him BMI of 58. On exam, has a gynoid distribution, good exercise tolerance, states he has no problem walking 1 mile. Currently, not on any medication. Discussed various options. At this point, clearly needs long term control mechanism, as the weight gain seen with controlled mechanism being removed is expected. Psychologically stable, works as a , will have no problem following instruction and his recidivism is 100 % physiological and not based on any other issues. Will start GLP analog to try to encourage pre-op weight loss, obvious insulin, proceed to MDS once appropriate insurance approval obtained.

## 2023-12-20 NOTE — HISTORY OF PRESENT ILLNESS
[de-identified] : Mr. Matt Asencio is seen today. Previously, seen in 2007, at which time, he had a lap band at age 17, returned in 2019 with emesis, fluid was removed after which he did well then visited ER in [place] which I assume was slippage of the lap band which was treated with removal. Today, his weight increased to 464 lbs. which gives him BMI of 58. On exam, has a gynoid distribution, good exercise tolerance, states he has no problem walking 1 mile. Currently, not on any medication. Discussed various options. At this point, clearly needs long term control mechanism, as the weight gain seen with controlled mechanism being removed is expected. Psychologically stable, works as a , will have no problem following instruction and his recidivism is 100 % physiological and not based on any other issues. Will start GLP analog to try to encourage pre-op weight loss, obvious insulin, proceed to MDS once appropriate insurance approval obtained.

## 2023-12-20 NOTE — END OF VISIT
[FreeTextEntry3] : All medical record entries made by the Scribe were at my, THALIA Dunbar , direction and personally dictated by me on 12/20/2023. I have reviewed the chart and agree that the record accurately reflects my personal performance of the history, physical exam, assessment and plan. I have also personally directed, reviewed, and agreed with the chart. [Time Spent: ___ minutes] : I have spent [unfilled] minutes of time on the encounter.

## 2023-12-20 NOTE — PLAN
[FreeTextEntry1] : Will have the patient start on GLP analog along with pre-op diet and exercise. Will begin the bariatric workup.

## 2023-12-22 ENCOUNTER — APPOINTMENT (OUTPATIENT)
Dept: INTERNAL MEDICINE | Facility: CLINIC | Age: 33
End: 2023-12-22
Payer: COMMERCIAL

## 2023-12-22 ENCOUNTER — NON-APPOINTMENT (OUTPATIENT)
Age: 33
End: 2023-12-22

## 2023-12-22 VITALS
HEIGHT: 75 IN | DIASTOLIC BLOOD PRESSURE: 83 MMHG | TEMPERATURE: 97.5 F | HEART RATE: 70 BPM | SYSTOLIC BLOOD PRESSURE: 126 MMHG | OXYGEN SATURATION: 97 % | BODY MASS INDEX: 39.17 KG/M2 | WEIGHT: 315 LBS

## 2023-12-22 DIAGNOSIS — Z23 ENCOUNTER FOR IMMUNIZATION: ICD-10-CM

## 2023-12-22 DIAGNOSIS — E55.9 VITAMIN D DEFICIENCY, UNSPECIFIED: ICD-10-CM

## 2023-12-22 PROCEDURE — 99385 PREV VISIT NEW AGE 18-39: CPT | Mod: 25

## 2023-12-22 PROCEDURE — 90686 IIV4 VACC NO PRSV 0.5 ML IM: CPT

## 2023-12-22 PROCEDURE — G0008: CPT

## 2023-12-22 RX ORDER — ERGOCALCIFEROL 1.25 MG/1
1.25 MG CAPSULE, LIQUID FILLED ORAL
Qty: 12 | Refills: 0 | Status: ACTIVE | COMMUNITY
Start: 2023-12-22 | End: 1900-01-01

## 2023-12-22 RX ORDER — DEXTROAMPHETAMINE SACCHARATE, AMPHETAMINE ASPARTATE, DEXTROAMPHETAMINE SULFATE, AND AMPHETAMINE SULFATE 3.75; 3.75; 3.75; 3.75 MG/1; MG/1; MG/1; MG/1
TABLET ORAL
Refills: 0 | Status: DISCONTINUED | COMMUNITY
End: 2023-12-22

## 2023-12-22 NOTE — PLAN
[FreeTextEntry1] : #HCM -flu shot administered today -had recent bloodwork done  #Morbid obesity -hx lap band at age 17, s/p slippage of band which required removal -pt being followed by bariatric surgery for eval for MDS surgery -continue at least 30mins-1hr aerobic exercises/day x5 days per week advised  -decreased food portion sizes, increase in whole grains, assorted vegetables and fruits and decreased sugar beverages discussed and encouraged  -has appt with nutritionist -follow up in clinic in 1 months for lifestyle modification goals evaluation.  #ADHD -has been off adderall for 2 months, uses when at work -I-STOP reviewed -adderall prescription sent  #Vitamin D deficiency -supplement prescribed

## 2023-12-22 NOTE — HISTORY OF PRESENT ILLNESS
[FreeTextEntry1] : 33 M presents for CPE [de-identified] : 33 M PMH morbid obesity, ADHD presents for new patient CPE. Pt referred by bariatric surgeon Dr. Ho. Pt with hx of morbid obesity, had lap band surgery at age 17 but had slippage of lap band and was removed in the ER.  With lap band procedure lost over 200 lbs and now over past 7 years has regained weight. Pt evaluated by bariatric surgery and will be scheduled for another procedure. Patient with hx of ADHD, previously taking adderall but has been off for past 2 months. Pt recently reinstated as  and requesting refill for adderall, states only uses it on days which he works. Patient's diet consist of 2 serving of vegetables, one serving protein and at least gallon of water, low carb. Will be seeing dietician soon. For exercise pt walks daily and has also started to do some weight lifting. Pt prescribed ozempic by Dr. Ho to lose some weight before surgery, waiting for approval of medication.

## 2023-12-22 NOTE — PHYSICAL EXAM
[Normal Sclera/Conjunctiva] : normal sclera/conjunctiva [Coordination Grossly Intact] : coordination grossly intact [No Focal Deficits] : no focal deficits [Normal] : affect was normal and insight and judgment were intact [de-identified] : obese

## 2023-12-22 NOTE — HEALTH RISK ASSESSMENT
[1 or 2 (0 pts)] : 1 or 2 (0 points) [Never (0 pts)] : Never (0 points) [With Family] : lives with family [Employed] : employed [Single] : single [Sexually Active] : sexually active [Good] : ~his/her~  mood as  good [Yes] : Yes [Monthly or less (1 pt)] : Monthly or less (1 point) [# Of Children ___] : has [unfilled] children [Reports changes in hearing] : Reports no changes in hearing [Reports changes in vision] : Reports no changes in vision [de-identified] :  [Never] : Never

## 2023-12-27 ENCOUNTER — NON-APPOINTMENT (OUTPATIENT)
Age: 33
End: 2023-12-27

## 2023-12-27 LAB
25(OH)D3 SERPL-MCNC: 11.8 NG/ML
A-TOCOPHEROL VIT E SERPL-MCNC: 7.2 MG/L
ALBUMIN SERPL ELPH-MCNC: 4.3 G/DL
ALP BLD-CCNC: 74 U/L
ALT SERPL-CCNC: 56 U/L
ANION GAP SERPL CALC-SCNC: 13 MMOL/L
APO B SERPL-MCNC: 106 MG/DL
AST SERPL-CCNC: 36 U/L
BETA+GAMMA TOCOPHEROL SERPL-MCNC: 1.4 MG/L
BILIRUB SERPL-MCNC: 0.2 MG/DL
BUN SERPL-MCNC: 14 MG/DL
CALCIUM SERPL-MCNC: 9.2 MG/DL
CALCIUM SERPL-MCNC: 9.2 MG/DL
CHLORIDE SERPL-SCNC: 107 MMOL/L
CHOLEST SERPL-MCNC: 169 MG/DL
CO2 SERPL-SCNC: 21 MMOL/L
CREAT SERPL-MCNC: 1.01 MG/DL
CRP SERPL-MCNC: 5 MG/L
EGFR: 99 ML/MIN/1.73M2
ESTIMATED AVERAGE GLUCOSE: 111 MG/DL
FOLATE SERPL-MCNC: 17.5 NG/ML
GLUCOSE SERPL-MCNC: 101 MG/DL
HBA1C MFR BLD HPLC: 5.5 %
HDLC SERPL-MCNC: 25 MG/DL
IGF BP1 SERPL-MCNC: 131 NG/ML
INSULIN SERPL-MCNC: 43.3 UU/ML
IRON SERPL-MCNC: 56 UG/DL
LACTATE BLDA-MCNC: 1.1 MMOL/L
LDLC SERPL CALC-MCNC: 120 MG/DL
NONHDLC SERPL-MCNC: 144 MG/DL
PARATHYROID HORMONE INTACT: 68 PG/ML
POTASSIUM SERPL-SCNC: 4.4 MMOL/L
PREALB SERPL NEPH-MCNC: 23 MG/DL
PROT SERPL-MCNC: 7.1 G/DL
SODIUM SERPL-SCNC: 141 MMOL/L
TRIGL SERPL-MCNC: 130 MG/DL
TSH SERPL-ACNC: 1.38 UIU/ML
VIT A SERPL-MCNC: 45.2 UG/DL
VIT B12 SERPL-MCNC: 340 PG/ML

## 2023-12-29 ENCOUNTER — NON-APPOINTMENT (OUTPATIENT)
Age: 33
End: 2023-12-29

## 2023-12-29 LAB — VIT B1 SERPL-MCNC: 96.1 NMOL/L

## 2024-01-04 LAB — ZINC SERPL-MCNC: 78 UG/DL

## 2024-01-08 ENCOUNTER — OUTPATIENT (OUTPATIENT)
Dept: OUTPATIENT SERVICES | Facility: HOSPITAL | Age: 34
LOS: 1 days | End: 2024-01-08
Payer: COMMERCIAL

## 2024-01-08 ENCOUNTER — RESULT REVIEW (OUTPATIENT)
Age: 34
End: 2024-01-08

## 2024-01-08 PROCEDURE — 71046 X-RAY EXAM CHEST 2 VIEWS: CPT

## 2024-01-08 PROCEDURE — 71046 X-RAY EXAM CHEST 2 VIEWS: CPT | Mod: 26

## 2024-01-22 ENCOUNTER — APPOINTMENT (OUTPATIENT)
Dept: BARIATRICS | Facility: CLINIC | Age: 34
End: 2024-01-22

## 2024-01-23 ENCOUNTER — APPOINTMENT (OUTPATIENT)
Dept: BARIATRICS | Facility: CLINIC | Age: 34
End: 2024-01-23
Payer: COMMERCIAL

## 2024-01-23 PROCEDURE — 97802 MEDICAL NUTRITION INDIV IN: CPT

## 2024-01-25 ENCOUNTER — OUTPATIENT (OUTPATIENT)
Dept: OUTPATIENT SERVICES | Facility: HOSPITAL | Age: 34
LOS: 1 days | End: 2024-01-25
Payer: COMMERCIAL

## 2024-01-25 DIAGNOSIS — E66.01 MORBID (SEVERE) OBESITY DUE TO EXCESS CALORIES: ICD-10-CM

## 2024-01-25 PROCEDURE — 93005 ELECTROCARDIOGRAM TRACING: CPT

## 2024-01-25 PROCEDURE — 93010 ELECTROCARDIOGRAM REPORT: CPT

## 2024-02-05 ENCOUNTER — APPOINTMENT (OUTPATIENT)
Dept: BARIATRICS | Facility: CLINIC | Age: 34
End: 2024-02-05

## 2024-02-06 ENCOUNTER — APPOINTMENT (OUTPATIENT)
Dept: INTERNAL MEDICINE | Facility: CLINIC | Age: 34
End: 2024-02-06

## 2024-02-12 ENCOUNTER — APPOINTMENT (OUTPATIENT)
Dept: INTERNAL MEDICINE | Facility: CLINIC | Age: 34
End: 2024-02-12
Payer: COMMERCIAL

## 2024-02-12 VITALS
HEIGHT: 75 IN | TEMPERATURE: 97.3 F | HEART RATE: 77 BPM | OXYGEN SATURATION: 98 % | BODY MASS INDEX: 39.17 KG/M2 | SYSTOLIC BLOOD PRESSURE: 125 MMHG | WEIGHT: 315 LBS | DIASTOLIC BLOOD PRESSURE: 78 MMHG

## 2024-02-12 DIAGNOSIS — F90.9 ATTENTION-DEFICIT HYPERACTIVITY DISORDER, UNSPECIFIED TYPE: ICD-10-CM

## 2024-02-12 PROCEDURE — 99213 OFFICE O/P EST LOW 20 MIN: CPT

## 2024-02-12 RX ORDER — SEMAGLUTIDE 0.68 MG/ML
2 INJECTION, SOLUTION SUBCUTANEOUS
Qty: 1 | Refills: 0 | Status: DISCONTINUED | COMMUNITY
Start: 2023-12-20 | End: 2024-02-12

## 2024-02-12 RX ORDER — ORAL SEMAGLUTIDE 3 MG/1
3 TABLET ORAL
Qty: 30 | Refills: 0 | Status: DISCONTINUED | COMMUNITY
Start: 2023-12-27 | End: 2024-02-12

## 2024-02-12 NOTE — HISTORY OF PRESENT ILLNESS
[FreeTextEntry1] : 33 M presents for f/u [de-identified] : 28 M PMH morbid obesity, ADHD presents for f/u. Pt currently being evaluated by bariatric surgery, awaiting to be scheduled for procedure. Awaiting upper GI procedure and pysch appointment. Pt states has been trying to continue to improve diet, with 2 servings vegetables, one serving protein and low carb. He says clothes fitting looser. Has not been able to go to gym but has been walking a lot and his job requires physical activity. Pt previously prescribed ozempic and rybelsus for weight loss but denied by insurance.

## 2024-02-12 NOTE — ASSESSMENT
[FreeTextEntry1] : #HCM -vaccines UTD -had recent bloodwork done  #Morbid obesity -hx lap band at age 17, s/p slippage of band which required removal -pt being followed by bariatric surgery for eval for MDS surgery -continue at least 30mins-1hr aerobic exercises/day x5 days per week advised -decreased food portion sizes, increase in whole grains, assorted vegetables and fruits and decreased sugar beverages discussed and encouraged -following with nutrionist -ozempic and rybelsus previously prescribed but denied by insurance. Will prescribe wegovy  #ADHD -uses only when at work -I-STOP reviewed -adderall refilled today  #Vitamin D deficiency -continue supplementation   RTC 2 months

## 2024-02-15 RX ORDER — SEMAGLUTIDE 0.25 MG/.5ML
0.25 INJECTION, SOLUTION SUBCUTANEOUS
Qty: 1 | Refills: 0 | Status: ACTIVE | COMMUNITY
Start: 2024-02-12 | End: 1900-01-01

## 2024-02-20 ENCOUNTER — APPOINTMENT (OUTPATIENT)
Dept: BARIATRICS | Facility: CLINIC | Age: 34
End: 2024-02-20

## 2024-02-26 ENCOUNTER — APPOINTMENT (OUTPATIENT)
Dept: BARIATRICS | Facility: CLINIC | Age: 34
End: 2024-02-26

## 2024-03-01 ENCOUNTER — APPOINTMENT (OUTPATIENT)
Dept: BARIATRICS | Facility: CLINIC | Age: 34
End: 2024-03-01

## 2024-03-08 ENCOUNTER — APPOINTMENT (OUTPATIENT)
Dept: BARIATRICS | Facility: CLINIC | Age: 34
End: 2024-03-08
Payer: COMMERCIAL

## 2024-03-08 PROCEDURE — 97803 MED NUTRITION INDIV SUBSEQ: CPT | Mod: 93

## 2024-03-14 ENCOUNTER — OUTPATIENT (OUTPATIENT)
Dept: OUTPATIENT SERVICES | Facility: HOSPITAL | Age: 34
LOS: 1 days | End: 2024-03-14
Payer: COMMERCIAL

## 2024-03-14 ENCOUNTER — APPOINTMENT (OUTPATIENT)
Dept: RADIOLOGY | Facility: HOSPITAL | Age: 34
End: 2024-03-14

## 2024-03-14 PROCEDURE — 74240 X-RAY XM UPR GI TRC 1CNTRST: CPT | Mod: 26

## 2024-03-14 PROCEDURE — 74240 X-RAY XM UPR GI TRC 1CNTRST: CPT

## 2024-03-20 ENCOUNTER — APPOINTMENT (OUTPATIENT)
Dept: INTERNAL MEDICINE | Facility: CLINIC | Age: 34
End: 2024-03-20

## 2024-03-26 ENCOUNTER — APPOINTMENT (OUTPATIENT)
Dept: INTERNAL MEDICINE | Facility: CLINIC | Age: 34
End: 2024-03-26
Payer: COMMERCIAL

## 2024-03-26 VITALS
HEART RATE: 60 BPM | DIASTOLIC BLOOD PRESSURE: 102 MMHG | TEMPERATURE: 97.3 F | OXYGEN SATURATION: 96 % | WEIGHT: 315 LBS | SYSTOLIC BLOOD PRESSURE: 104 MMHG | HEIGHT: 75 IN | BODY MASS INDEX: 39.17 KG/M2

## 2024-03-26 VITALS — SYSTOLIC BLOOD PRESSURE: 120 MMHG | DIASTOLIC BLOOD PRESSURE: 85 MMHG

## 2024-03-26 DIAGNOSIS — Z01.818 ENCOUNTER FOR OTHER PREPROCEDURAL EXAMINATION: ICD-10-CM

## 2024-03-26 PROCEDURE — 99214 OFFICE O/P EST MOD 30 MIN: CPT

## 2024-03-26 PROCEDURE — 36415 COLL VENOUS BLD VENIPUNCTURE: CPT

## 2024-03-28 ENCOUNTER — TRANSCRIPTION ENCOUNTER (OUTPATIENT)
Age: 34
End: 2024-03-28

## 2024-03-28 PROBLEM — Z01.818 PREOP EXAMINATION: Status: ACTIVE | Noted: 2024-03-26

## 2024-03-28 LAB
ALBUMIN SERPL ELPH-MCNC: 4.1 G/DL
ALP BLD-CCNC: 85 U/L
ALT SERPL-CCNC: 18 U/L
ANION GAP SERPL CALC-SCNC: 11 MMOL/L
APPEARANCE: CLEAR
APTT BLD: 34.5 SEC
AST SERPL-CCNC: 17 U/L
BASOPHILS # BLD AUTO: 0.04 K/UL
BASOPHILS NFR BLD AUTO: 0.6 %
BILIRUB SERPL-MCNC: 0.2 MG/DL
BILIRUBIN URINE: NEGATIVE
BLOOD URINE: NEGATIVE
BUN SERPL-MCNC: 15 MG/DL
CALCIUM SERPL-MCNC: 8.9 MG/DL
CHLORIDE SERPL-SCNC: 107 MMOL/L
CO2 SERPL-SCNC: 23 MMOL/L
COLOR: YELLOW
CREAT SERPL-MCNC: 0.94 MG/DL
EGFR: 108 ML/MIN/1.73M2
EOSINOPHIL # BLD AUTO: 0.23 K/UL
EOSINOPHIL NFR BLD AUTO: 3.6 %
GLUCOSE QUALITATIVE U: NEGATIVE MG/DL
GLUCOSE SERPL-MCNC: 106 MG/DL
HCT VFR BLD CALC: 47 %
HGB BLD-MCNC: 15.9 G/DL
IMM GRANULOCYTES NFR BLD AUTO: 0.6 %
INR PPP: 1.05 RATIO
KETONES URINE: NEGATIVE MG/DL
LEUKOCYTE ESTERASE URINE: NEGATIVE
LYMPHOCYTES # BLD AUTO: 2.17 K/UL
LYMPHOCYTES NFR BLD AUTO: 34 %
MAN DIFF?: NORMAL
MCHC RBC-ENTMCNC: 27.4 PG
MCHC RBC-ENTMCNC: 33.8 GM/DL
MCV RBC AUTO: 81 FL
MONOCYTES # BLD AUTO: 0.58 K/UL
MONOCYTES NFR BLD AUTO: 9.1 %
NEUTROPHILS # BLD AUTO: 3.32 K/UL
NEUTROPHILS NFR BLD AUTO: 52.1 %
NITRITE URINE: NEGATIVE
PH URINE: 6.5
PLATELET # BLD AUTO: 282 K/UL
POTASSIUM SERPL-SCNC: 4.4 MMOL/L
PROT SERPL-MCNC: 6.9 G/DL
PROTEIN URINE: NEGATIVE MG/DL
PT BLD: 11.9 SEC
RBC # BLD: 5.8 M/UL
RBC # FLD: 13.9 %
SODIUM SERPL-SCNC: 141 MMOL/L
SPECIFIC GRAVITY URINE: 1.02
UROBILINOGEN URINE: 0.2 MG/DL
WBC # FLD AUTO: 6.38 K/UL

## 2024-03-28 NOTE — HISTORY OF PRESENT ILLNESS
[No Pertinent Cardiac History] : no history of aortic stenosis, atrial fibrillation, coronary artery disease, recent myocardial infarction, or implantable device/pacemaker [No Pertinent Pulmonary History] : no history of asthma, COPD, sleep apnea, or smoking [No Adverse Anesthesia Reaction] : no adverse anesthesia reaction in self or family member [(Patient denies any chest pain, claudication, dyspnea on exertion, orthopnea, palpitations or syncope)] : Patient denies any chest pain, claudication, dyspnea on exertion, orthopnea, palpitations or syncope [Good (7-10 METs)] : Good (7-10 METs) [Aortic Stenosis] : no aortic stenosis [Atrial Fibrillation] : no atrial fibrillation [Coronary Artery Disease] : no coronary artery disease [Implantable Device/Pacemaker] : no implantable device/pacemaker [Recent Myocardial Infarction] : no recent myocardial infarction [Asthma] : no asthma [COPD] : no COPD [Sleep Apnea] : no sleep apnea [Smoker] : not a smoker [Family Member] : no family member with adverse anesthesia reaction/sudden death [Self] : no previous adverse anesthesia reaction [Chronic Anticoagulation] : no chronic anticoagulation [Chronic Kidney Disease] : no chronic kidney disease [Diabetes] : no diabetes [FreeTextEntry1] : bariatric surgery  [FreeTextEntry2] : 4/9/24 [FreeTextEntry3] : Dr. Ho  [FreeTextEntry4] : 28 M PMH morbid obesity, ADHD presents for pre-op clearance. Denies fever, chills, chest pain, SOB, N/V, abdominal pain, headache, cough, palpitations, leg pain, dizziness, weakness.

## 2024-04-02 ENCOUNTER — APPOINTMENT (OUTPATIENT)
Dept: BARIATRICS | Facility: CLINIC | Age: 34
End: 2024-04-02

## 2024-04-03 ENCOUNTER — APPOINTMENT (OUTPATIENT)
Dept: BARIATRICS | Facility: CLINIC | Age: 34
End: 2024-04-03
Payer: COMMERCIAL

## 2024-04-03 VITALS — HEIGHT: 75 IN | WEIGHT: 315 LBS | BODY MASS INDEX: 39.17 KG/M2

## 2024-04-03 PROCEDURE — 99214 OFFICE O/P EST MOD 30 MIN: CPT

## 2024-04-03 NOTE — END OF VISIT
[FreeTextEntry3] :  All medical record entries made by the Scribe were at my, THALIA Dunbar , direction and personally dictated by me on 04/03/2024 . I have reviewed the chart and agree that the record accurately reflects my personal performance of the history, physical exam, assessment and plan. I have also personally directed, reviewed, and agreed with the chart.  [Time Spent: ___ minutes] : I have spent [unfilled] minutes of time on the encounter.

## 2024-04-03 NOTE — ASSESSMENT
[FreeTextEntry1] : Matt Asencio is a 32 y/o M with history of lap band and lap band removal who presents today for a final visit prior to bariatric surgery. We educated him on the importance of lifestyle changes after surgery including a healthy diet with high fiber/protein diet and regular exercise. Additionally, Pre- op and post-op instructions given. The procedure was discussed in great detail including the risks of surgery such as leaks, blood clots, and death. All questions were answered, and the patient has no other concerns at this time. Labs and UGI reviewed. No contraindication to proceed. The patient is scheduled for MDS on 04/09/2024.

## 2024-04-03 NOTE — HISTORY OF PRESENT ILLNESS
[de-identified] : Matt Asencio is a 34 y/o M with history of lap band and lap band removal who presents today for a final visit prior to bariatric surgery. We educated him on the importance of lifestyle changes after surgery including a healthy diet with high fiber/protein diet and regular exercise. Additionally, Pre- op and post-op instructions given. The procedure was discussed in great detail including the risks of surgery such as leaks, blood clots, and death. All questions were answered, and the patient has no other concerns at this time. Labs and UGI reviewed. No contraindication to proceed. The patient is scheduled for MDS on 04/09/2024.

## 2024-04-03 NOTE — REASON FOR VISIT
[Medical Office: ([facility])___] : at the medical office located in  [Patient] : the patient [Follow-Up Visit] : a follow-up visit for [Morbid Obesity (BMI>40)] : morbid obesity (bmi>40)

## 2024-04-08 ENCOUNTER — TRANSCRIPTION ENCOUNTER (OUTPATIENT)
Age: 34
End: 2024-04-08

## 2024-04-08 VITALS
HEART RATE: 62 BPM | HEIGHT: 76 IN | RESPIRATION RATE: 16 BRPM | TEMPERATURE: 98 F | WEIGHT: 315 LBS | DIASTOLIC BLOOD PRESSURE: 87 MMHG | SYSTOLIC BLOOD PRESSURE: 143 MMHG | OXYGEN SATURATION: 95 %

## 2024-04-09 ENCOUNTER — INPATIENT (INPATIENT)
Facility: HOSPITAL | Age: 34
LOS: 1 days | Discharge: HOME | DRG: 621 | End: 2024-04-11
Attending: SURGERY | Admitting: SURGERY
Payer: COMMERCIAL

## 2024-04-09 ENCOUNTER — APPOINTMENT (OUTPATIENT)
Dept: BARIATRICS | Facility: HOSPITAL | Age: 34
End: 2024-04-09

## 2024-04-09 ENCOUNTER — RESULT REVIEW (OUTPATIENT)
Age: 34
End: 2024-04-09

## 2024-04-09 DIAGNOSIS — Z98.84 BARIATRIC SURGERY STATUS: Chronic | ICD-10-CM

## 2024-04-09 DIAGNOSIS — Z87.81 PERSONAL HISTORY OF (HEALED) TRAUMATIC FRACTURE: Chronic | ICD-10-CM

## 2024-04-09 LAB
BLD GP AB SCN SERPL QL: NEGATIVE — SIGNIFICANT CHANGE UP
HCT VFR BLD CALC: 45.2 % — SIGNIFICANT CHANGE UP (ref 39–50)
HGB BLD-MCNC: 14.9 G/DL — SIGNIFICANT CHANGE UP (ref 13–17)
MCHC RBC-ENTMCNC: 27.4 PG — SIGNIFICANT CHANGE UP (ref 27–34)
MCHC RBC-ENTMCNC: 33 GM/DL — SIGNIFICANT CHANGE UP (ref 32–36)
MCV RBC AUTO: 83.2 FL — SIGNIFICANT CHANGE UP (ref 80–100)
NRBC # BLD: 0 /100 WBCS — SIGNIFICANT CHANGE UP (ref 0–0)
PLATELET # BLD AUTO: 256 K/UL — SIGNIFICANT CHANGE UP (ref 150–400)
RBC # BLD: 5.43 M/UL — SIGNIFICANT CHANGE UP (ref 4.2–5.8)
RBC # FLD: 13.4 % — SIGNIFICANT CHANGE UP (ref 10.3–14.5)
RH IG SCN BLD-IMP: POSITIVE — SIGNIFICANT CHANGE UP
RH IG SCN BLD-IMP: POSITIVE — SIGNIFICANT CHANGE UP
WBC # BLD: 13.29 K/UL — HIGH (ref 3.8–10.5)
WBC # FLD AUTO: 13.29 K/UL — HIGH (ref 3.8–10.5)

## 2024-04-09 PROCEDURE — 43659 UNLISTED LAPS PX STOMACH: CPT | Mod: 22

## 2024-04-09 PROCEDURE — 43332 TRANSAB ESOPH HIAT HERN RPR: CPT | Mod: 22

## 2024-04-09 PROCEDURE — 88307 TISSUE EXAM BY PATHOLOGIST: CPT | Mod: 26

## 2024-04-09 DEVICE — CLIP APPLIER ETHICON LIGAMAX 5MM: Type: IMPLANTABLE DEVICE | Status: FUNCTIONAL

## 2024-04-09 DEVICE — STAPLER ETHICON GST ECHELON 60MM GREEN RELOAD: Type: IMPLANTABLE DEVICE | Status: FUNCTIONAL

## 2024-04-09 DEVICE — STAPLER ETHICON GST ECHELON 60MM BLUE RELOAD: Type: IMPLANTABLE DEVICE | Status: FUNCTIONAL

## 2024-04-09 DEVICE — SURGICEL POWDER 3 GRAMS: Type: IMPLANTABLE DEVICE | Status: FUNCTIONAL

## 2024-04-09 DEVICE — STAPLER ETHICON ECHELON ENDOPATH 60MM: Type: IMPLANTABLE DEVICE | Status: FUNCTIONAL

## 2024-04-09 RX ORDER — ACETAMINOPHEN 500 MG
1000 TABLET ORAL ONCE
Refills: 0 | Status: COMPLETED | OUTPATIENT
Start: 2024-04-09 | End: 2024-04-09

## 2024-04-09 RX ORDER — THIAMINE MONONITRATE (VIT B1) 100 MG
500 TABLET ORAL DAILY
Refills: 0 | Status: DISCONTINUED | OUTPATIENT
Start: 2024-04-09 | End: 2024-04-11

## 2024-04-09 RX ORDER — HYDRALAZINE HCL 50 MG
10 TABLET ORAL ONCE
Refills: 0 | Status: DISCONTINUED | OUTPATIENT
Start: 2024-04-09 | End: 2024-04-09

## 2024-04-09 RX ORDER — LABETALOL HCL 100 MG
10 TABLET ORAL ONCE
Refills: 0 | Status: COMPLETED | OUTPATIENT
Start: 2024-04-09 | End: 2024-04-09

## 2024-04-09 RX ORDER — ONDANSETRON 8 MG/1
4 TABLET, FILM COATED ORAL EVERY 6 HOURS
Refills: 0 | Status: DISCONTINUED | OUTPATIENT
Start: 2024-04-09 | End: 2024-04-11

## 2024-04-09 RX ORDER — HYDRALAZINE HCL 50 MG
10 TABLET ORAL ONCE
Refills: 0 | Status: COMPLETED | OUTPATIENT
Start: 2024-04-09 | End: 2024-04-09

## 2024-04-09 RX ORDER — HYDROMORPHONE HYDROCHLORIDE 2 MG/ML
0.5 INJECTION INTRAMUSCULAR; INTRAVENOUS; SUBCUTANEOUS
Refills: 0 | Status: DISCONTINUED | OUTPATIENT
Start: 2024-04-09 | End: 2024-04-09

## 2024-04-09 RX ORDER — ENOXAPARIN SODIUM 100 MG/ML
30 INJECTION SUBCUTANEOUS ONCE
Refills: 0 | Status: COMPLETED | OUTPATIENT
Start: 2024-04-09 | End: 2024-04-09

## 2024-04-09 RX ORDER — SCOPALAMINE 1 MG/3D
1 PATCH, EXTENDED RELEASE TRANSDERMAL ONCE
Refills: 0 | Status: COMPLETED | OUTPATIENT
Start: 2024-04-09 | End: 2024-04-09

## 2024-04-09 RX ORDER — KETOROLAC TROMETHAMINE 30 MG/ML
15 SYRINGE (ML) INJECTION EVERY 6 HOURS
Refills: 0 | Status: DISCONTINUED | OUTPATIENT
Start: 2024-04-09 | End: 2024-04-11

## 2024-04-09 RX ORDER — PANTOPRAZOLE SODIUM 20 MG/1
40 TABLET, DELAYED RELEASE ORAL DAILY
Refills: 0 | Status: DISCONTINUED | OUTPATIENT
Start: 2024-04-09 | End: 2024-04-11

## 2024-04-09 RX ORDER — HYDROMORPHONE HYDROCHLORIDE 2 MG/ML
0.5 INJECTION INTRAMUSCULAR; INTRAVENOUS; SUBCUTANEOUS ONCE
Refills: 0 | Status: DISCONTINUED | OUTPATIENT
Start: 2024-04-09 | End: 2024-04-09

## 2024-04-09 RX ORDER — ACETAMINOPHEN 500 MG
650 TABLET ORAL EVERY 6 HOURS
Refills: 0 | Status: DISCONTINUED | OUTPATIENT
Start: 2024-04-09 | End: 2024-04-11

## 2024-04-09 RX ORDER — ONDANSETRON 8 MG/1
4 TABLET, FILM COATED ORAL ONCE
Refills: 0 | Status: COMPLETED | OUTPATIENT
Start: 2024-04-09 | End: 2024-04-09

## 2024-04-09 RX ORDER — SODIUM CHLORIDE 9 MG/ML
1000 INJECTION, SOLUTION INTRAVENOUS
Refills: 0 | Status: DISCONTINUED | OUTPATIENT
Start: 2024-04-09 | End: 2024-04-10

## 2024-04-09 RX ORDER — DEXTROAMPHETAMINE SACCHARATE, AMPHETAMINE ASPARTATE, DEXTROAMPHETAMINE SULFATE AND AMPHETAMINE SULFATE 1.875; 1.875; 1.875; 1.875 MG/1; MG/1; MG/1; MG/1
1 TABLET ORAL
Refills: 0 | DISCHARGE

## 2024-04-09 RX ADMIN — Medication 10 MILLIGRAM(S): at 17:13

## 2024-04-09 RX ADMIN — Medication 650 MILLIGRAM(S): at 21:07

## 2024-04-09 RX ADMIN — Medication 10 MILLIGRAM(S): at 13:10

## 2024-04-09 RX ADMIN — HYDROMORPHONE HYDROCHLORIDE 0.5 MILLIGRAM(S): 2 INJECTION INTRAMUSCULAR; INTRAVENOUS; SUBCUTANEOUS at 23:55

## 2024-04-09 RX ADMIN — Medication 10 MILLIGRAM(S): at 16:19

## 2024-04-09 RX ADMIN — SCOPALAMINE 1 PATCH: 1 PATCH, EXTENDED RELEASE TRANSDERMAL at 07:31

## 2024-04-09 RX ADMIN — Medication 1000 MILLIGRAM(S): at 07:30

## 2024-04-09 RX ADMIN — HYDROMORPHONE HYDROCHLORIDE 0.5 MILLIGRAM(S): 2 INJECTION INTRAMUSCULAR; INTRAVENOUS; SUBCUTANEOUS at 13:55

## 2024-04-09 RX ADMIN — HYDROMORPHONE HYDROCHLORIDE 0.5 MILLIGRAM(S): 2 INJECTION INTRAMUSCULAR; INTRAVENOUS; SUBCUTANEOUS at 13:40

## 2024-04-09 RX ADMIN — HYDROMORPHONE HYDROCHLORIDE 0.5 MILLIGRAM(S): 2 INJECTION INTRAMUSCULAR; INTRAVENOUS; SUBCUTANEOUS at 18:48

## 2024-04-09 RX ADMIN — SCOPALAMINE 1 PATCH: 1 PATCH, EXTENDED RELEASE TRANSDERMAL at 18:19

## 2024-04-09 RX ADMIN — HYDROMORPHONE HYDROCHLORIDE 0.5 MILLIGRAM(S): 2 INJECTION INTRAMUSCULAR; INTRAVENOUS; SUBCUTANEOUS at 16:11

## 2024-04-09 RX ADMIN — HYDROMORPHONE HYDROCHLORIDE 0.5 MILLIGRAM(S): 2 INJECTION INTRAMUSCULAR; INTRAVENOUS; SUBCUTANEOUS at 15:54

## 2024-04-09 RX ADMIN — ONDANSETRON 4 MILLIGRAM(S): 8 TABLET, FILM COATED ORAL at 18:49

## 2024-04-09 RX ADMIN — ENOXAPARIN SODIUM 30 MILLIGRAM(S): 100 INJECTION SUBCUTANEOUS at 07:31

## 2024-04-09 RX ADMIN — ONDANSETRON 4 MILLIGRAM(S): 8 TABLET, FILM COATED ORAL at 21:18

## 2024-04-09 RX ADMIN — PANTOPRAZOLE SODIUM 40 MILLIGRAM(S): 20 TABLET, DELAYED RELEASE ORAL at 18:49

## 2024-04-09 RX ADMIN — Medication 650 MILLIGRAM(S): at 22:07

## 2024-04-09 RX ADMIN — Medication 10 MILLIGRAM(S): at 15:06

## 2024-04-09 RX ADMIN — HYDROMORPHONE HYDROCHLORIDE 0.5 MILLIGRAM(S): 2 INJECTION INTRAMUSCULAR; INTRAVENOUS; SUBCUTANEOUS at 19:10

## 2024-04-09 RX ADMIN — HYDROMORPHONE HYDROCHLORIDE 0.5 MILLIGRAM(S): 2 INJECTION INTRAMUSCULAR; INTRAVENOUS; SUBCUTANEOUS at 23:25

## 2024-04-09 NOTE — H&P ADULT - NSICDXPASTSURGICALHX_GEN_ALL_CORE_FT
PAST SURGICAL HISTORY:  H/O laparoscopic adjustable gastric banding     History of fracture of right ankle

## 2024-04-09 NOTE — BRIEF OPERATIVE NOTE - OPERATION/FINDINGS
.MDS   lap sleeve gastrectomy created along 42Fr Bougie. Hemostasis achieved. Duodenum divided using robotic stapler. Ileocecal junction identified, bowel run 300cm proximally. Stay sutures placed, electrocautery used for incision. Two layer anastomosis created using running 2-0 PDS suture. Anastomosis tested with ICG and without evidence of leak. Hiatal hernia repaired with left and right felipe reapproximated with nonabsorbable quil.  Fascia at 12mm post site closed. Port sites x 4 closed w/ 4-0 Monocryl sutures & surgical glue.

## 2024-04-09 NOTE — BRIEF OPERATIVE NOTE - NSICDXBRIEFPROCEDURE_GEN_ALL_CORE_FT
PROCEDURES:  Gastrectomy, sleeve, laparoscopic, with duodenal switch 09-Apr-2024 12:05:52  Krystin Aguilera

## 2024-04-09 NOTE — H&P ADULT - NSHPPHYSICALEXAM_GEN_ALL_CORE
LOS:     VITALS:   T(C): 36.6 (04-08-24 @ 09:10), Max: 36.6 (04-08-24 @ 09:10)  HR: 62 (04-08-24 @ 09:10) (62 - 62)  BP: 143/87 (04-08-24 @ 09:10) (143/87 - 143/87)  RR: 16 (04-08-24 @ 09:10) (16 - 16)  SpO2: 95% (04-08-24 @ 09:10) (95% - 95%)    GENERAL: NAD, lying in bed comfortably  HEAD:  Atraumatic, Normocephalic  EYES: EOMI, PERRLA, conjunctiva and sclera clear  ENT: Moist mucous membranes  NECK: Supple, No JVD  CHEST/LUNG: Clear to auscultation bilaterally; No rales, rhonchi, wheezing, or rubs. Unlabored respirations  HEART: Regular rate and rhythm; No murmurs, rubs, or gallops  ABDOMEN: BSx4; Soft, nontender, nondistended  EXTREMITIES:  2+ Peripheral Pulses, brisk capillary refill. No clubbing, cyanosis, or edema  NERVOUS SYSTEM:  A&Ox3, no focal deficits   SKIN: No rashes or lesions

## 2024-04-09 NOTE — PRE-ANESTHESIA EVALUATION ADULT - NSANTHOSAYNRD_GEN_A_CORE
No. VERNA screening performed.  STOP BANG Legend: 0-2 = LOW Risk; 3-4 = INTERMEDIATE Risk; 5-8 = HIGH Risk

## 2024-04-09 NOTE — H&P ADULT - ASSESSMENT
32 y/o M with PMH of ADHD, morbid obesity (BMI 58.7) history of lap band and lap band removal presenting for MDS. Plan for OR and then observation on the floor.    BCLD/IVF  Pain & nausea   PPI  OOB/A, IS, SCDs  Thiamine  Dietician cs  AM labs

## 2024-04-09 NOTE — H&P ADULT - HISTORY OF PRESENT ILLNESS
32 y/o M with PMH of ADHD, morbid obesity (BMI 58.7) history of lap band and lap band removal presenting for MDS. UGI was normal did no show a hernia     Home meds: Adderall 20 mg oral tablet

## 2024-04-10 LAB
ANION GAP SERPL CALC-SCNC: 10 MMOL/L — SIGNIFICANT CHANGE UP (ref 5–17)
BUN SERPL-MCNC: 12 MG/DL — SIGNIFICANT CHANGE UP (ref 7–23)
CALCIUM SERPL-MCNC: 9 MG/DL — SIGNIFICANT CHANGE UP (ref 8.4–10.5)
CHLORIDE SERPL-SCNC: 105 MMOL/L — SIGNIFICANT CHANGE UP (ref 96–108)
CO2 SERPL-SCNC: 23 MMOL/L — SIGNIFICANT CHANGE UP (ref 22–31)
CREAT SERPL-MCNC: 0.91 MG/DL — SIGNIFICANT CHANGE UP (ref 0.5–1.3)
EGFR: 113 ML/MIN/1.73M2 — SIGNIFICANT CHANGE UP
GLUCOSE SERPL-MCNC: 99 MG/DL — SIGNIFICANT CHANGE UP (ref 70–99)
HCT VFR BLD CALC: 42.2 % — SIGNIFICANT CHANGE UP (ref 39–50)
HGB BLD-MCNC: 14.2 G/DL — SIGNIFICANT CHANGE UP (ref 13–17)
MAGNESIUM SERPL-MCNC: 2.2 MG/DL — SIGNIFICANT CHANGE UP (ref 1.6–2.6)
MCHC RBC-ENTMCNC: 27.8 PG — SIGNIFICANT CHANGE UP (ref 27–34)
MCHC RBC-ENTMCNC: 33.6 GM/DL — SIGNIFICANT CHANGE UP (ref 32–36)
MCV RBC AUTO: 82.6 FL — SIGNIFICANT CHANGE UP (ref 80–100)
NRBC # BLD: 0 /100 WBCS — SIGNIFICANT CHANGE UP (ref 0–0)
PHOSPHATE SERPL-MCNC: 4.1 MG/DL — SIGNIFICANT CHANGE UP (ref 2.5–4.5)
PLATELET # BLD AUTO: 240 K/UL — SIGNIFICANT CHANGE UP (ref 150–400)
POTASSIUM SERPL-MCNC: 4 MMOL/L — SIGNIFICANT CHANGE UP (ref 3.5–5.3)
POTASSIUM SERPL-SCNC: 4 MMOL/L — SIGNIFICANT CHANGE UP (ref 3.5–5.3)
RBC # BLD: 5.11 M/UL — SIGNIFICANT CHANGE UP (ref 4.2–5.8)
RBC # FLD: 13.6 % — SIGNIFICANT CHANGE UP (ref 10.3–14.5)
SODIUM SERPL-SCNC: 138 MMOL/L — SIGNIFICANT CHANGE UP (ref 135–145)
WBC # BLD: 12.78 K/UL — HIGH (ref 3.8–10.5)
WBC # FLD AUTO: 12.78 K/UL — HIGH (ref 3.8–10.5)

## 2024-04-10 RX ORDER — ACETAMINOPHEN 500 MG
1000 TABLET ORAL ONCE
Refills: 0 | Status: COMPLETED | OUTPATIENT
Start: 2024-04-10 | End: 2024-04-10

## 2024-04-10 RX ORDER — SODIUM CHLORIDE 9 MG/ML
1000 INJECTION, SOLUTION INTRAVENOUS
Refills: 0 | Status: DISCONTINUED | OUTPATIENT
Start: 2024-04-10 | End: 2024-04-11

## 2024-04-10 RX ADMIN — Medication 1000 MILLIGRAM(S): at 23:32

## 2024-04-10 RX ADMIN — Medication 15 MILLIGRAM(S): at 05:11

## 2024-04-10 RX ADMIN — Medication 15 MILLIGRAM(S): at 17:57

## 2024-04-10 RX ADMIN — SCOPALAMINE 1 PATCH: 1 PATCH, EXTENDED RELEASE TRANSDERMAL at 20:00

## 2024-04-10 RX ADMIN — Medication 400 MILLIGRAM(S): at 10:58

## 2024-04-10 RX ADMIN — Medication 400 MILLIGRAM(S): at 22:51

## 2024-04-10 RX ADMIN — Medication 105 MILLIGRAM(S): at 12:48

## 2024-04-10 RX ADMIN — Medication 15 MILLIGRAM(S): at 23:02

## 2024-04-10 RX ADMIN — SCOPALAMINE 1 PATCH: 1 PATCH, EXTENDED RELEASE TRANSDERMAL at 05:11

## 2024-04-10 RX ADMIN — Medication 15 MILLIGRAM(S): at 12:47

## 2024-04-10 RX ADMIN — PANTOPRAZOLE SODIUM 40 MILLIGRAM(S): 20 TABLET, DELAYED RELEASE ORAL at 12:47

## 2024-04-10 NOTE — DIETITIAN INITIAL EVALUATION ADULT - ADD RECOMMEND
1. Advance to bariatric full liquid diet as tolerated  2. Reinforce diet education  3. Monitor lytes and replete prn.   4. Pain regimen per team

## 2024-04-10 NOTE — DIETITIAN INITIAL EVALUATION ADULT - PERTINENT LABORATORY DATA
04-10    138  |  105  |  12  ----------------------------<  99  4.0   |  23  |  0.91    Ca    9.0      10 Apr 2024 05:30  Phos  4.1     04-10  Mg     2.2     04-10

## 2024-04-10 NOTE — DIETITIAN INITIAL EVALUATION ADULT - OTHER CALCULATIONS
Ideal body weight used for calculations as pt >120% of IBW. Needs estimated for post-op/wound healing. Needs above for maintenance.  1-2 weeks post-op needs: 10-12 kcal/kg = 916-1092 kcal/d  Protein: 1.2-2.1 g/kg = 110-192g/d  Fluids: >64fl.oz/d

## 2024-04-10 NOTE — DIETITIAN INITIAL EVALUATION ADULT - PERTINENT MEDS FT
MEDICATIONS  (STANDING):  ketorolac   Injectable 15 milliGRAM(s) IV Push every 6 hours  lactated ringers. 1000 milliLiter(s) (100 mL/Hr) IV Continuous <Continuous>  pantoprazole  Injectable 40 milliGRAM(s) IV Push daily  thiamine IVPB 500 milliGRAM(s) IV Intermittent daily    MEDICATIONS  (PRN):  acetaminophen   Oral Liquid .. 650 milliGRAM(s) Oral every 6 hours PRN Mild Pain (1 - 3)  ondansetron Injectable 4 milliGRAM(s) IV Push every 6 hours PRN Nausea and/or Vomiting

## 2024-04-10 NOTE — DIETITIAN INITIAL EVALUATION ADULT - OTHER INFO
34 y/o M with PMH of ADHD, morbid obesity (BMI 58.7) history of lap band and lap band removal now s/p MDS AND HHR 4/9. Pt seen in room, awake, alert, pleasant. Breathing comfortably on room air. Pt reports poor tolerance of CLD earlier d/t nausea and small amount of vomiting, but admits to feeling a bit better now and will try to have some more liquids soon. LR running at 100ml/hr. Did admit to throat pain/soreness and surgical site pain. Was able to ambulate to chair earlier. Afebrile. Skin intact pressure-wise, no edema, noted with abdominal surgical wounds. MDS diet ed reviewed and handout provided. Encouraged outpatient RD f/u. Will continue to follow per RD protocol.

## 2024-04-11 ENCOUNTER — TRANSCRIPTION ENCOUNTER (OUTPATIENT)
Age: 34
End: 2024-04-11

## 2024-04-11 VITALS
TEMPERATURE: 99 F | OXYGEN SATURATION: 95 % | SYSTOLIC BLOOD PRESSURE: 155 MMHG | DIASTOLIC BLOOD PRESSURE: 92 MMHG | HEART RATE: 59 BPM | RESPIRATION RATE: 18 BRPM

## 2024-04-11 LAB
ANION GAP SERPL CALC-SCNC: 10 MMOL/L — SIGNIFICANT CHANGE UP (ref 5–17)
BUN SERPL-MCNC: 13 MG/DL — SIGNIFICANT CHANGE UP (ref 7–23)
CALCIUM SERPL-MCNC: 8.7 MG/DL — SIGNIFICANT CHANGE UP (ref 8.4–10.5)
CHLORIDE SERPL-SCNC: 106 MMOL/L — SIGNIFICANT CHANGE UP (ref 96–108)
CO2 SERPL-SCNC: 24 MMOL/L — SIGNIFICANT CHANGE UP (ref 22–31)
CREAT SERPL-MCNC: 1.05 MG/DL — SIGNIFICANT CHANGE UP (ref 0.5–1.3)
EGFR: 95 ML/MIN/1.73M2 — SIGNIFICANT CHANGE UP
GLUCOSE SERPL-MCNC: 91 MG/DL — SIGNIFICANT CHANGE UP (ref 70–99)
HCT VFR BLD CALC: 40 % — SIGNIFICANT CHANGE UP (ref 39–50)
HGB BLD-MCNC: 13.3 G/DL — SIGNIFICANT CHANGE UP (ref 13–17)
MAGNESIUM SERPL-MCNC: 1.9 MG/DL — SIGNIFICANT CHANGE UP (ref 1.6–2.6)
MCHC RBC-ENTMCNC: 27.5 PG — SIGNIFICANT CHANGE UP (ref 27–34)
MCHC RBC-ENTMCNC: 33.3 GM/DL — SIGNIFICANT CHANGE UP (ref 32–36)
MCV RBC AUTO: 82.6 FL — SIGNIFICANT CHANGE UP (ref 80–100)
NRBC # BLD: 0 /100 WBCS — SIGNIFICANT CHANGE UP (ref 0–0)
PHOSPHATE SERPL-MCNC: 3.2 MG/DL — SIGNIFICANT CHANGE UP (ref 2.5–4.5)
PLATELET # BLD AUTO: 224 K/UL — SIGNIFICANT CHANGE UP (ref 150–400)
POTASSIUM SERPL-MCNC: 3.9 MMOL/L — SIGNIFICANT CHANGE UP (ref 3.5–5.3)
POTASSIUM SERPL-SCNC: 3.9 MMOL/L — SIGNIFICANT CHANGE UP (ref 3.5–5.3)
RBC # BLD: 4.84 M/UL — SIGNIFICANT CHANGE UP (ref 4.2–5.8)
RBC # FLD: 13.6 % — SIGNIFICANT CHANGE UP (ref 10.3–14.5)
SODIUM SERPL-SCNC: 140 MMOL/L — SIGNIFICANT CHANGE UP (ref 135–145)
WBC # BLD: 9.13 K/UL — SIGNIFICANT CHANGE UP (ref 3.8–10.5)
WBC # FLD AUTO: 9.13 K/UL — SIGNIFICANT CHANGE UP (ref 3.8–10.5)

## 2024-04-11 PROCEDURE — 85027 COMPLETE CBC AUTOMATED: CPT

## 2024-04-11 PROCEDURE — C1889: CPT

## 2024-04-11 PROCEDURE — 84100 ASSAY OF PHOSPHORUS: CPT

## 2024-04-11 PROCEDURE — 86900 BLOOD TYPING SEROLOGIC ABO: CPT

## 2024-04-11 PROCEDURE — 83735 ASSAY OF MAGNESIUM: CPT

## 2024-04-11 PROCEDURE — 88307 TISSUE EXAM BY PATHOLOGIST: CPT

## 2024-04-11 PROCEDURE — 86850 RBC ANTIBODY SCREEN: CPT

## 2024-04-11 PROCEDURE — 36415 COLL VENOUS BLD VENIPUNCTURE: CPT

## 2024-04-11 PROCEDURE — 86901 BLOOD TYPING SEROLOGIC RH(D): CPT

## 2024-04-11 PROCEDURE — 80048 BASIC METABOLIC PNL TOTAL CA: CPT

## 2024-04-11 RX ORDER — FAMOTIDINE 10 MG/ML
20 INJECTION INTRAVENOUS ONCE
Refills: 0 | Status: COMPLETED | OUTPATIENT
Start: 2024-04-11 | End: 2024-04-11

## 2024-04-11 RX ORDER — ACETAMINOPHEN 500 MG
20 TABLET ORAL
Qty: 320 | Refills: 0
Start: 2024-04-11 | End: 2024-04-14

## 2024-04-11 RX ORDER — HYDROMORPHONE HYDROCHLORIDE 2 MG/ML
0.5 INJECTION INTRAMUSCULAR; INTRAVENOUS; SUBCUTANEOUS ONCE
Refills: 0 | Status: DISCONTINUED | OUTPATIENT
Start: 2024-04-11 | End: 2024-04-11

## 2024-04-11 RX ORDER — MAGNESIUM SULFATE 500 MG/ML
1 VIAL (ML) INJECTION ONCE
Refills: 0 | Status: COMPLETED | OUTPATIENT
Start: 2024-04-11 | End: 2024-04-11

## 2024-04-11 RX ORDER — OMEPRAZOLE 10 MG/1
1 CAPSULE, DELAYED RELEASE ORAL
Qty: 30 | Refills: 0
Start: 2024-04-11 | End: 2024-05-10

## 2024-04-11 RX ORDER — APIXABAN 2.5 MG/1
1 TABLET, FILM COATED ORAL
Qty: 60 | Refills: 0
Start: 2024-04-11 | End: 2024-05-10

## 2024-04-11 RX ADMIN — Medication 15 MILLIGRAM(S): at 12:10

## 2024-04-11 RX ADMIN — Medication 15 MILLIGRAM(S): at 17:00

## 2024-04-11 RX ADMIN — HYDROMORPHONE HYDROCHLORIDE 0.5 MILLIGRAM(S): 2 INJECTION INTRAMUSCULAR; INTRAVENOUS; SUBCUTANEOUS at 02:00

## 2024-04-11 RX ADMIN — HYDROMORPHONE HYDROCHLORIDE 0.5 MILLIGRAM(S): 2 INJECTION INTRAMUSCULAR; INTRAVENOUS; SUBCUTANEOUS at 01:20

## 2024-04-11 RX ADMIN — Medication 100 GRAM(S): at 13:23

## 2024-04-11 RX ADMIN — SODIUM CHLORIDE 100 MILLILITER(S): 9 INJECTION, SOLUTION INTRAVENOUS at 08:55

## 2024-04-11 RX ADMIN — SCOPALAMINE 1 PATCH: 1 PATCH, EXTENDED RELEASE TRANSDERMAL at 20:26

## 2024-04-11 RX ADMIN — SCOPALAMINE 1 PATCH: 1 PATCH, EXTENDED RELEASE TRANSDERMAL at 06:32

## 2024-04-11 RX ADMIN — Medication 15 MILLIGRAM(S): at 11:49

## 2024-04-11 RX ADMIN — Medication 15 MILLIGRAM(S): at 05:11

## 2024-04-11 RX ADMIN — FAMOTIDINE 20 MILLIGRAM(S): 10 INJECTION INTRAVENOUS at 13:23

## 2024-04-11 RX ADMIN — PANTOPRAZOLE SODIUM 40 MILLIGRAM(S): 20 TABLET, DELAYED RELEASE ORAL at 11:48

## 2024-04-11 RX ADMIN — Medication 105 MILLIGRAM(S): at 11:49

## 2024-04-11 NOTE — DISCHARGE NOTE PROVIDER - NSDCCPCAREPLAN_GEN_ALL_CORE_FT
PRINCIPAL DISCHARGE DIAGNOSIS  Diagnosis: Morbid obesity  Assessment and Plan of Treatment: 33 year old male with PMH of ADHD, HLD, morbid obesity (BMI 58.7) history of lap band and lap band removal now s/p MDS AND HHR Pt tolerated the procedure well. At time of discharge, pt was tolerating a bariatric clear liquid diet, and pt's pain was controlled. Plan is to follow up with Dr. Ho in the office.  1) Please take Tylenol 650 mg every 4 to 6 hours by mouth for moderate pain control. Please do not exceed over 4,000 mg of Tylenol a day.  2) Please start taking Eliquis 2.5 mg by mouth twice a day .  3) Please take Omeprazole 40 mg once a day by mouth.

## 2024-04-11 NOTE — DISCHARGE NOTE NURSING/CASE MANAGEMENT/SOCIAL WORK - PATIENT PORTAL LINK FT
You can access the FollowMyHealth Patient Portal offered by [facility] by registering at the following website: [email or web address]. By joining [facility] [employer]’s FollowMyHealth portal, you will also be able to view your health information using other applications (apps) compatible with our system.

## 2024-04-11 NOTE — PROGRESS NOTE ADULT - SUBJECTIVE AND OBJECTIVE BOX
INTERVAL HPI/OVERNIGHT EVENTS: pTOV,  Hgb 14.9, added toradol   4/9: s/p MDS AND HHR, htn in PACU, 170's/100, 10 labetolol given, prn pain breakthrough pain meds given, repeat 159/93. POC wnl. able to wean off non rebreather. BP HTN to 177/102, 10 labetolol given repeat /105 HR 62, 10 hydralazine given, 166/100.     STATUS POST:   Laparoscopic modified duodenal switch and hiatal hernia repair     POST OPERATIVE DAY #: 1    SUBJECTIVE: Patient examined bedside with Dr. Ho. Patient complaining of nausea and spit up.  Patient reports he is not tolerating bariatric clear liquid diet. Patient reports he is ambulating and using incentive spirometer. Patient denies SOB and chest pain Patient making adequate urine output.           Vital Signs Last 24 Hrs  T(C): 37 (10 Apr 2024 04:42), Max: 37.2 (09 Apr 2024 12:27)  T(F): 98.6 (10 Apr 2024 04:42), Max: 99 (09 Apr 2024 12:27)  HR: 77 (10 Apr 2024 04:42) (62 - 85)  BP: 135/83 (10 Apr 2024 04:42) (125/87 - 181/108)  BP(mean): 128 (09 Apr 2024 17:17) (113 - 136)  RR: 18 (10 Apr 2024 04:42) (15 - 26)  SpO2: 95% (10 Apr 2024 04:42) (88% - 97%)    Parameters below as of 10 Apr 2024 04:42  Patient On (Oxygen Delivery Method): nasal cannula  O2 Flow (L/min): 3    I&O's Detail    09 Apr 2024 07:01  -  10 Apr 2024 07:00  --------------------------------------------------------  IN:    Lactated Ringers: 1950 mL  Total IN: 1950 mL    OUT:    Voided (mL): 2675 mL  Total OUT: 2675 mL    Total NET: -725 mL          General: NAD, resting comfortably in bed  C/V: NSR  Pulm: Nonlabored breathing, no respiratory distress  Abd: Soft, non-distended, TTP around incision site, incision clean dry and intact.   Extrem: WWP, no edema, SCDs in place      LABS:                        14.2   12.78 )-----------( 240      ( 10 Apr 2024 05:30 )             42.2     04-10    138  |  105  |  12  ----------------------------<  99  4.0   |  23  |  0.91    Ca    9.0      10 Apr 2024 05:30  Phos  4.1     04-10  Mg     2.2     04-10        Urinalysis Basic - ( 10 Apr 2024 05:30 )    Color: x / Appearance: x / SG: x / pH: x  Gluc: 99 mg/dL / Ketone: x  / Bili: x / Urobili: x   Blood: x / Protein: x / Nitrite: x   Leuk Esterase: x / RBC: x / WBC x   Sq Epi: x / Non Sq Epi: x / Bacteria: x    
POST-OPERATIVE NOTE    Procedure: s/p MDS and HHR    Diagnosis/Indication: Obesity    Surgeon: Dr. Ho    S: Pt seen in PACU resting in bed with nasal cannula. Patient endorses uncontrolled upper abdominal pain refractory to medication. Denies chest pain, SOB, headache, dizziness, DUEÑAS, calf tenderness, numbness, or tingling. Denies nausea, vomiting.    O:  T(C): 37.2 (04-09-24 @ 12:27), Max: 37.2 (04-09-24 @ 12:27)  T(F): 99 (04-09-24 @ 12:27), Max: 99 (04-09-24 @ 12:27)  HR: 63 (04-09-24 @ 13:50) (63 - 85)  BP: 161/92 (04-09-24 @ 13:50) (161/92 - 174/102)  RR: 22 (04-09-24 @ 13:50) (15 - 26)  SpO2: 95% (04-09-24 @ 13:50) (88% - 97%)  Wt(kg): --            Gen: NAD, resting comfortably in bed  Pulm: non-labored breathing without non-rebreather, no respiratory distress  Abd: soft, (+)mildly tender to incisional sites, appropriate for surgical pain, non-distended. Incisional sites clean, dry, and intact covered with dermabond. No bleeding, swelling, ecchymosis, purulence or drainage.   Extrem: WWP, no calf edema or tenderness, SCDs in place      A/P: 33y Male s/p above procedure  Diet: BCLD  IVF: @150  Pain/nausea control  DVT ppx: SDCs  
ON: RUDOLPH, VS/UOP WNL  4/10: +n/+ spit up, not tolerating BCLD, pain controlled , encouraged to walked     POST-OP DAY: 2 s/p Laparoscopic modified duodenal switch and hiatal hernia repair      SUBJECTIVE: Patient seen and examined bedside by Surgical resident. Resting comfortably in the bed this am states that last night was significantly better.  Patient reports he is now tolerating bariatric clear liquid diet. Patient reports he is ambulating and using incentive spirometer. Patient denies SOB and chest pain Patient making adequate urine output.       MEDICATIONS  (PRN):  acetaminophen   Oral Liquid .. 650 milliGRAM(s) Oral every 6 hours PRN Mild Pain (1 - 3)  ondansetron Injectable 4 milliGRAM(s) IV Push every 6 hours PRN Nausea and/or Vomiting      I&O's Detail    10 Apr 2024 07:01  -  11 Apr 2024 07:00  --------------------------------------------------------  IN:    IV PiggyBack: 200 mL    Lactated Ringers: 450 mL    Lactated Ringers: 900 mL  Total IN: 1550 mL    OUT:    Voided (mL): 1550 mL  Total OUT: 1550 mL    Total NET: 0 mL          Vital Signs Last 24 Hrs  T(C): 37.1 (11 Apr 2024 05:12), Max: 37.1 (11 Apr 2024 05:12)  T(F): 98.8 (11 Apr 2024 05:12), Max: 98.8 (11 Apr 2024 05:12)  HR: 64 (11 Apr 2024 05:12) (64 - 77)  BP: 127/79 (11 Apr 2024 05:12) (127/79 - 161/74)  BP(mean): --  RR: 19 (11 Apr 2024 05:12) (18 - 19)  SpO2: 94% (11 Apr 2024 05:12) (91% - 96%)    Parameters below as of 11 Apr 2024 05:12  Patient On (Oxygen Delivery Method): room air      General: NAD, resting comfortably in bed  C/V: NSR  Pulm: Nonlabored breathing, no respiratory distress  Abd: Soft, non-distended, TTP around incision site, incision clean dry and intact.   Extrem: WWP, no edema, SCDs in place    LABS:                        14.2   12.78 )-----------( 240      ( 10 Apr 2024 05:30 )             42.2     04-10    138  |  105  |  12  ----------------------------<  99  4.0   |  23  |  0.91    Ca    9.0      10 Apr 2024 05:30  Phos  4.1     04-10  Mg     2.2     04-10        Urinalysis Basic - ( 10 Apr 2024 05:30 )    Color: x / Appearance: x / SG: x / pH: x  Gluc: 99 mg/dL / Ketone: x  / Bili: x / Urobili: x   Blood: x / Protein: x / Nitrite: x   Leuk Esterase: x / RBC: x / WBC x   Sq Epi: x / Non Sq Epi: x / Bacteria: x        RADIOLOGY & ADDITIONAL STUDIES:

## 2024-04-11 NOTE — DISCHARGE NOTE PROVIDER - NSDCFUADDINST_GEN_ALL_CORE_FT
Follow up with Dr. Ho in 1 week. Call the office at  to schedule your appointment. You may shower; soap and water over incision sites. Do not scrub. Pat dry when done. No tub bathing or swimming until cleared. Keep incision sites out of the sun as scars will darken. No heavy lifting (>10lbs) or strenuous exercise. Diet: Bariatric Full Fluids. 60 grams protein daily.  64 fluid ounces water daily. Drink small sips throughout the day. Continue diet as outlined by paperwork received as a pre-operative patient. You should be urinating at least 3-4x per day. Call the office if you experience increasing abdominal pain, nausea, vomiting, or temperature >100.4F.  NO ASPIRIN OR NSAIDs until approved by Dr. Ho. Avoid alcoholic beverages until cleared by Dr. Ho.  1) Please take Tylenol 650 mg every 4 to 6 hours by mouth for moderate pain control. Please do not exceed over 4,000 mg of Tylenol a day.  2) Please start taking Eliquis 2.5 mg by mouth twice a day .  3) Please take Omeprazole 40 mg once a day by mouth.

## 2024-04-11 NOTE — DISCHARGE NOTE PROVIDER - NSDCMRMEDTOKEN_GEN_ALL_CORE_FT
Adderall 20 mg oral tablet: 1 tab(s) orally 2 times a day   acetaminophen 160 mg/5 mL oral liquid: 20 milliliter(s) orally every 6 hours as needed for -for moderate to severe pain MDD: 80 mL  Adderall 20 mg oral tablet: 1 tab(s) orally 2 times a day  Eliquis 2.5 mg oral tablet: 1 tab(s) orally 2 times a day MDD:2 tablets MDD: 2 tabs  omeprazole 40 mg oral delayed release capsule: 1 cap(s) orally once a day MDD: 1 cap

## 2024-04-11 NOTE — DISCHARGE NOTE PROVIDER - HOSPITAL COURSE
33 year old male with PMH of ADHD, HLD, morbid obesity (BMI 58.7) history of lap band and lap band removal now s/p MDS AND HHR Pt tolerated the procedure well. At time of discharge, pt was tolerating a bariatric clear liquid diet, and pt's pain was controlled. Plan is to follow up with Dr. Ho in the office.  1) Please take Tylenol 650 mg every 4 to 6 hours by mouth for moderate pain control. Please do not exceed over 4,000 mg of Tylenol a day.  2) Please start taking Eliquis 2.5 mg by mouth twice a day .  3) Please take Omeprazole 40 mg once a day by mouth.   33 year old male with PMH of ADHD, HLD, morbid obesity (BMI 58.7) history of lap band and lap band removal now s/p MDS AND HHR Pt tolerated the procedure well. At time of discharge, pt was tolerating a bariatric clear liquid diet, and pt's pain was controlled. Plan is to follow up with Dr. Ho in the office.   Mr. Asencio is a 33 year old male with PMH of ADHD, HLD, morbid obesity (BMI 58.7) history of lap band and lap band removal now s/p MDS AND HHR. Pt tolerated the procedure well. At time of discharge, pt was tolerating a bariatric clear liquid diet, and pt's pain was controlled. Plan is to follow up with Dr. Ho in the office.   Mr. Asencio is a 33 year old male with PMH of ADHD, HLD, morbid obesity (BMI 58.7) history of lap band and lap band removal now s/p MDS AND HHR. Pt tolerated the procedure well and post-operative course was uneventful. Patient passed trial of void and diet has been advanced to a Bariatric Clear Liquid Diet, which has been tolerated without nausea or vomiting. Patient is ambulating without difficulty and voiding adequately with bowel function. Pain is well controlled on oral pain medication. This patient is hemodynamically stable and deemed ready for discharge. Plan is to follow up with Dr. Ho in the office.

## 2024-04-11 NOTE — PROGRESS NOTE ADULT - ASSESSMENT
34 y/o M with PMH of ADHD, morbid obesity (BMI 58.7) history of lap band and lap band removal now s/p MDS AND HHR    BCLD/IVF  Pain & nausea   PPI  OOB/A, IS, SCDs  Thiamine  Dietician cs  AM labs
32 y/o M with PMH of ADHD, morbid obesity (BMI 58.7) history of lap band and lap band removal now s/p MDS AND HHR    BCLD/IVF  Pain & nausea   PPI  OOB/A, IS, SCDs  Thiamine  Dietician cs  AM labs
34 y/o M with PMH of ADHD, morbid obesity (BMI 58.7) history of lap band and lap band removal now s/p MDS AND HHR    BCLD/IVF  Pain & nausea   PPI  OOB/A, IS, SCDs  Thiamine  Dietician cs  AM labs

## 2024-04-11 NOTE — DISCHARGE NOTE NURSING/CASE MANAGEMENT/SOCIAL WORK - NSDCPEFALRISK_GEN_ALL_CORE
For information on Fall & Injury Prevention, visit: [email or web address] OR  [email or web address] OR  https://www.cdc.gov/steadi/patient.html

## 2024-04-11 NOTE — DISCHARGE NOTE PROVIDER - NSDCCPTREATMENT_GEN_ALL_CORE_FT
PRINCIPAL PROCEDURE  Procedure: Gastrectomy, sleeve, laparoscopic, with duodenal switch  Findings and Treatment:

## 2024-04-11 NOTE — DISCHARGE NOTE PROVIDER - CARE PROVIDER_API CALL
Kyle Ho  Surgery  [address]  [place] [address]  Phone: [phone]  Fax: [phone]  Scheduled Appointment: 04/24/2024

## 2024-04-12 ENCOUNTER — NON-APPOINTMENT (OUTPATIENT)
Age: 34
End: 2024-04-12

## 2024-04-16 LAB — SURGICAL PATHOLOGY STUDY: SIGNIFICANT CHANGE UP

## 2024-04-17 DIAGNOSIS — E66.01 MORBID (SEVERE) OBESITY DUE TO EXCESS CALORIES: ICD-10-CM

## 2024-04-17 DIAGNOSIS — E78.5 HYPERLIPIDEMIA, UNSPECIFIED: ICD-10-CM

## 2024-04-17 DIAGNOSIS — F90.9 ATTENTION-DEFICIT HYPERACTIVITY DISORDER, UNSPECIFIED TYPE: ICD-10-CM

## 2024-04-17 DIAGNOSIS — K44.9 DIAPHRAGMATIC HERNIA WITHOUT OBSTRUCTION OR GANGRENE: ICD-10-CM

## 2024-04-24 ENCOUNTER — APPOINTMENT (OUTPATIENT)
Dept: BARIATRICS | Facility: CLINIC | Age: 34
End: 2024-04-24
Payer: COMMERCIAL

## 2024-04-24 VITALS
SYSTOLIC BLOOD PRESSURE: 116 MMHG | BODY MASS INDEX: 39.17 KG/M2 | HEIGHT: 75 IN | WEIGHT: 315 LBS | OXYGEN SATURATION: 96 % | HEART RATE: 72 BPM | TEMPERATURE: 98.1 F | DIASTOLIC BLOOD PRESSURE: 76 MMHG

## 2024-04-24 DIAGNOSIS — E66.01 MORBID (SEVERE) OBESITY DUE TO EXCESS CALORIES: ICD-10-CM

## 2024-04-24 PROBLEM — E66.9 OBESITY, UNSPECIFIED: Chronic | Status: ACTIVE | Noted: 2024-04-08

## 2024-04-24 PROBLEM — F90.9 ATTENTION-DEFICIT HYPERACTIVITY DISORDER, UNSPECIFIED TYPE: Chronic | Status: ACTIVE | Noted: 2024-04-08

## 2024-04-24 PROBLEM — E78.5 HYPERLIPIDEMIA, UNSPECIFIED: Chronic | Status: ACTIVE | Noted: 2024-04-08

## 2024-04-24 PROCEDURE — 99024 POSTOP FOLLOW-UP VISIT: CPT

## 2024-04-24 NOTE — HISTORY OF PRESENT ILLNESS
[de-identified] : Mr. Asencio is 2 weeks s/p MDS and HH repair on 04/09/2024. denies n/v/c/d/ SOB, acid reflux, po intolerance, chest pain, abd pain, dizziness, fever and calf pain with no signs of infection. tolerating stage 2 diet well with adequate protein intake. will continue working on increasing fluids as reports urine is dark. vital signs are stable. incisions on left abdomen with mild drainage but healing well with no sign of infection. instructed to wash daily with soap and water. applied bacitracin and cover with gauze during the day. compliant with vitamins and having daily bm.

## 2024-04-24 NOTE — PHYSICAL EXAM
[de-identified] :  incisions on left abdomen with mild drainage but healing well with no sign of infection

## 2024-04-24 NOTE — ASSESSMENT
[FreeTextEntry1] : Pt is a 34 y/o M having stable post op care 2 weeks s/p MDS. reviewed incision care, healing well with no sign of infections. denies fever and feels comfortable advancing diet as per guidelines. instructed he may begin exercising but refrain from heavy lifting for 4-6 weeks . f/u in 4-6 weeks

## 2024-04-24 NOTE — END OF VISIT
[Time Spent: ___ minutes] : I have spent [unfilled] minutes of time on the encounter. [FreeTextEntry3] :  All medical record entries made by the Scribe were at my, THALIA Dunbar , direction and personally dictated by me on 04/24/2024 . I have reviewed the chart and agree that the record accurately reflects my personal performance of the history, physical exam, assessment and plan. I have also personally directed, reviewed, and agreed with the chart.

## 2024-04-24 NOTE — PLAN
[FreeTextEntry1] : instructed he may begin exercising but refrain from heavy lifting for 4-6 weeks . f/u in 4-6 weeks

## 2024-05-09 RX ORDER — DEXTROAMPHETAMINE SACCHARATE, AMPHETAMINE ASPARTATE, DEXTROAMPHETAMINE SULFATE AND AMPHETAMINE SULFATE 5; 5; 5; 5 MG/1; MG/1; MG/1; MG/1
20 TABLET ORAL TWICE DAILY
Qty: 60 | Refills: 0 | Status: ACTIVE | COMMUNITY
Start: 2023-12-22 | End: 1900-01-01

## 2024-05-13 ENCOUNTER — NON-APPOINTMENT (OUTPATIENT)
Age: 34
End: 2024-05-13

## 2024-05-13 ENCOUNTER — APPOINTMENT (OUTPATIENT)
Dept: INTERNAL MEDICINE | Facility: CLINIC | Age: 34
End: 2024-05-13
Payer: COMMERCIAL

## 2024-05-13 VITALS
HEART RATE: 63 BPM | BODY MASS INDEX: 39.17 KG/M2 | TEMPERATURE: 97.2 F | OXYGEN SATURATION: 96 % | DIASTOLIC BLOOD PRESSURE: 77 MMHG | SYSTOLIC BLOOD PRESSURE: 138 MMHG | HEIGHT: 75 IN | WEIGHT: 315 LBS

## 2024-05-13 DIAGNOSIS — K21.9 GASTRO-ESOPHAGEAL REFLUX DISEASE W/OUT ESOPHAGITIS: ICD-10-CM

## 2024-05-13 DIAGNOSIS — Z98.84 BARIATRIC SURGERY STATUS: ICD-10-CM

## 2024-05-13 PROCEDURE — 99213 OFFICE O/P EST LOW 20 MIN: CPT

## 2024-05-13 RX ORDER — ELECTROLYTES/DEXTROSE
SOLUTION, ORAL ORAL DAILY
Qty: 90 | Refills: 0 | Status: ACTIVE | COMMUNITY
Start: 2024-05-13 | End: 1900-01-01

## 2024-05-13 RX ORDER — OMEPRAZOLE 40 MG/1
40 CAPSULE, DELAYED RELEASE ORAL DAILY
Qty: 1 | Refills: 0 | Status: ACTIVE | COMMUNITY
Start: 2024-05-13 | End: 1900-01-01

## 2024-05-13 NOTE — PLAN
[FreeTextEntry1] : #S/p bariatric surgery -incisions healing well -omeprazole reordered for acid reflux -letter for work provided

## 2024-05-13 NOTE — HISTORY OF PRESENT ILLNESS
[FreeTextEntry1] : 33 M presents  [de-identified] : 33 M s/p MSD and HH repair on 4/9/24 presents for post op visit. Pt states has been some drainage from incision L abdomen, saw his surgeon April 24th and told healing well with no signs of infection. Cleaning wound with soap and water and applying bacitracin daily. Mentions some acid reflux was prescribed omeprazole but has run out. 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

## 2024-05-13 NOTE — PHYSICAL EXAM
[Normal] : normal rate, regular rhythm, normal S1 and S2 and no murmur heard [de-identified] : incision L side abdomen with some drainage. No erythema, warmth or TTP

## 2024-05-14 DIAGNOSIS — L30.9 DERMATITIS, UNSPECIFIED: ICD-10-CM

## 2024-05-14 RX ORDER — TRIAMCINOLONE ACETONIDE 1 MG/G
0.1 CREAM TOPICAL DAILY
Qty: 1 | Refills: 0 | Status: ACTIVE | COMMUNITY
Start: 2024-05-14 | End: 1900-01-01

## 2024-07-12 ENCOUNTER — APPOINTMENT (OUTPATIENT)
Dept: INTERNAL MEDICINE | Facility: CLINIC | Age: 34
End: 2024-07-12
Payer: COMMERCIAL

## 2024-07-12 VITALS
OXYGEN SATURATION: 95 % | SYSTOLIC BLOOD PRESSURE: 119 MMHG | HEIGHT: 75 IN | BODY MASS INDEX: 39.17 KG/M2 | HEART RATE: 67 BPM | TEMPERATURE: 98.3 F | DIASTOLIC BLOOD PRESSURE: 79 MMHG | WEIGHT: 315 LBS

## 2024-07-12 DIAGNOSIS — F90.9 ATTENTION-DEFICIT HYPERACTIVITY DISORDER, UNSPECIFIED TYPE: ICD-10-CM

## 2024-07-12 DIAGNOSIS — E66.01 MORBID (SEVERE) OBESITY DUE TO EXCESS CALORIES: ICD-10-CM

## 2024-07-12 DIAGNOSIS — L30.9 DERMATITIS, UNSPECIFIED: ICD-10-CM

## 2024-07-12 PROCEDURE — 99213 OFFICE O/P EST LOW 20 MIN: CPT

## 2024-07-17 ENCOUNTER — APPOINTMENT (OUTPATIENT)
Dept: BARIATRICS | Facility: CLINIC | Age: 34
End: 2024-07-17

## 2024-09-25 ENCOUNTER — TRANSCRIPTION ENCOUNTER (OUTPATIENT)
Age: 34
End: 2024-09-25

## 2024-10-17 ENCOUNTER — APPOINTMENT (OUTPATIENT)
Dept: INTERNAL MEDICINE | Facility: CLINIC | Age: 34
End: 2024-10-17

## 2024-11-01 ENCOUNTER — APPOINTMENT (OUTPATIENT)
Dept: INTERNAL MEDICINE | Facility: CLINIC | Age: 34
End: 2024-11-01

## 2024-11-18 ENCOUNTER — APPOINTMENT (OUTPATIENT)
Dept: INTERNAL MEDICINE | Facility: CLINIC | Age: 34
End: 2024-11-18

## 2024-12-02 ENCOUNTER — APPOINTMENT (OUTPATIENT)
Dept: INTERNAL MEDICINE | Facility: CLINIC | Age: 34
End: 2024-12-02
Payer: COMMERCIAL

## 2024-12-02 DIAGNOSIS — L30.9 DERMATITIS, UNSPECIFIED: ICD-10-CM

## 2024-12-02 DIAGNOSIS — F90.9 ATTENTION-DEFICIT HYPERACTIVITY DISORDER, UNSPECIFIED TYPE: ICD-10-CM

## 2024-12-02 PROCEDURE — 99213 OFFICE O/P EST LOW 20 MIN: CPT

## 2024-12-02 PROCEDURE — G2211 COMPLEX E/M VISIT ADD ON: CPT | Mod: NC

## 2025-01-09 ENCOUNTER — TRANSCRIPTION ENCOUNTER (OUTPATIENT)
Age: 35
End: 2025-01-09

## 2025-02-12 ENCOUNTER — TRANSCRIPTION ENCOUNTER (OUTPATIENT)
Age: 35
End: 2025-02-12

## 2025-03-24 ENCOUNTER — TRANSCRIPTION ENCOUNTER (OUTPATIENT)
Age: 35
End: 2025-03-24

## 2025-04-28 ENCOUNTER — TRANSCRIPTION ENCOUNTER (OUTPATIENT)
Age: 35
End: 2025-04-28

## 2025-06-05 ENCOUNTER — TRANSCRIPTION ENCOUNTER (OUTPATIENT)
Age: 35
End: 2025-06-05

## 2025-06-17 NOTE — PATIENT PROFILE ADULT - FALL HARM RISK - ATTEMPT OOB
"[facility] Plan of Care Note  Referral Source: Nurse On-Call                               Chief Complaint   Patient presents with    Toe Pain       Wife states pt his toes are turning black right and left foot. Pt was in the hospital for a week for the same problem a while back - like four years ago. He was told if this ever happened again to "come into the hospital right away." Pt denies pain or fever. They are wanting to see a podiatrist.     Recommendation: Emergency Department                            "
No

## (undated) DEVICE — GLV 6.5 PROTEXIS (WHITE)

## (undated) DEVICE — VENODYNE/SCD SLEEVE CALF BARIATRIC

## (undated) DEVICE — APPLICATOR SURGICEL LAP TROCAR POINT 2.5MM X 150MM

## (undated) DEVICE — MARKING PEN W RULER

## (undated) DEVICE — SUT VICRYL 0 54" TIES

## (undated) DEVICE — LIGASURE MARYLAND 37CM

## (undated) DEVICE — Device

## (undated) DEVICE — TROCAR ETHICON ENDOPATH XCEL UNIVERSAL SLEEVE WITH OPTIVIEW 5MM X 100MM

## (undated) DEVICE — DRAPE LEGGINGS XL

## (undated) DEVICE — TROCAR ETHICON ENDOPATH XCEL BLADELESS 5MM X 100MM STABILITY

## (undated) DEVICE — SYR LUER LOK 30CC

## (undated) DEVICE — SUT PDS II 2-0 27" SH

## (undated) DEVICE — CATH NG SALEM SUMP 16FR

## (undated) DEVICE — TROCAR ETHICON ENDOPATH XCEL BLADELESS 15MM X 100MM STABILITY

## (undated) DEVICE — D HELP - CLEARVIEW CLEARIFY SYSTEM

## (undated) DEVICE — DRSG DERMABOND 0.7ML

## (undated) DEVICE — ENDOCATCH II 15MM

## (undated) DEVICE — STAPLER COVIDIEN ENDO GIA XL HANDLE

## (undated) DEVICE — TROCAR ETHICON ENDOPATH XCEL UNIVERSAL SLEEVE 12MM X 100M STABILITY

## (undated) DEVICE — PACK GENERAL LAPAROSCOPY

## (undated) DEVICE — DRAPE 1/2 SHEET 40X57"

## (undated) DEVICE — TROCAR ETHICON ENDOPATH XCEL BLADELESS 12MM X 100MM STABILITY

## (undated) DEVICE — DRAPE 3/4 SHEET 52X76"

## (undated) DEVICE — TROCAR ETHICON ENDOPATH XCEL BLADELESS 5MM X 150MM STABILITY

## (undated) DEVICE — TUBING STRYKER PNEUMOCLEAR HIGH FLOW

## (undated) DEVICE — SUT PDO 2-0 1/2 CIRCLE 26MM NDL 15CM

## (undated) DEVICE — SUT QUILL POLYPROPYLENE 1 15CM 22MM CLEAR

## (undated) DEVICE — TIP METZENBAUM SCISSOR MONOPOLAR ENDOCUT (ORANGE)

## (undated) DEVICE — POSITIONER SAGE MOBILITY TRANSFER PAD

## (undated) DEVICE — SUT MONOCRYL 4-0 27" PS-2 UNDYED

## (undated) DEVICE — STAPLER ECHELON FLEX POWERED PLUS 440MM

## (undated) DEVICE — POSITIONER FOAM EGG CRATE ULNAR 2PCS (PINK)